# Patient Record
Sex: FEMALE | Race: WHITE | Employment: OTHER | URBAN - METROPOLITAN AREA
[De-identification: names, ages, dates, MRNs, and addresses within clinical notes are randomized per-mention and may not be internally consistent; named-entity substitution may affect disease eponyms.]

---

## 2017-06-16 ENCOUNTER — HOSPITAL ENCOUNTER (OUTPATIENT)
Dept: MAMMOGRAPHY | Age: 82
Discharge: HOME | End: 2017-06-16
Attending: INTERNAL MEDICINE
Payer: MEDICARE

## 2017-06-16 DIAGNOSIS — Z12.31 VISIT FOR SCREENING MAMMOGRAM: ICD-10-CM

## 2017-06-16 PROCEDURE — 77063 BREAST TOMOSYNTHESIS BI: CPT

## 2017-06-29 ENCOUNTER — HOSPITAL ENCOUNTER (OUTPATIENT)
Dept: MAMMOGRAPHY | Age: 82
Discharge: HOME | End: 2017-06-29
Attending: INTERNAL MEDICINE
Payer: MEDICARE

## 2017-06-29 DIAGNOSIS — R92.8 ABNORMAL MAMMOGRAM: ICD-10-CM

## 2017-06-29 PROCEDURE — 76642 ULTRASOUND BREAST LIMITED: CPT

## 2017-06-29 PROCEDURE — 77065 DX MAMMO INCL CAD UNI: CPT

## 2018-09-21 ENCOUNTER — HOSPITAL ENCOUNTER (OUTPATIENT)
Dept: MAMMOGRAPHY | Age: 83
Discharge: HOME | End: 2018-09-21
Attending: INTERNAL MEDICINE
Payer: MEDICARE

## 2018-09-21 DIAGNOSIS — Z12.31 VISIT FOR SCREENING MAMMOGRAM: ICD-10-CM

## 2018-09-21 PROCEDURE — 77063 BREAST TOMOSYNTHESIS BI: CPT

## 2019-09-25 ENCOUNTER — HOSPITAL ENCOUNTER (OUTPATIENT)
Dept: MAMMOGRAPHY | Age: 84
Discharge: HOME | End: 2019-09-25
Attending: INTERNAL MEDICINE
Payer: MEDICARE

## 2019-09-25 DIAGNOSIS — Z12.39 BREAST SCREENING: ICD-10-CM

## 2019-09-25 PROCEDURE — 77067 SCR MAMMO BI INCL CAD: CPT

## 2020-09-23 ENCOUNTER — OFFICE VISIT (OUTPATIENT)
Dept: CARDIOLOGY CLINIC | Age: 85
End: 2020-09-23
Payer: MEDICARE

## 2020-09-23 VITALS
DIASTOLIC BLOOD PRESSURE: 80 MMHG | BODY MASS INDEX: 34.17 KG/M2 | OXYGEN SATURATION: 98 % | HEART RATE: 50 BPM | SYSTOLIC BLOOD PRESSURE: 140 MMHG | WEIGHT: 181 LBS | HEIGHT: 61 IN | TEMPERATURE: 95.8 F

## 2020-09-23 DIAGNOSIS — R07.9 CHEST PAIN AT REST: Primary | ICD-10-CM

## 2020-09-23 DIAGNOSIS — I89.0 LYMPHEDEMA: ICD-10-CM

## 2020-09-23 PROCEDURE — G8400 PT W/DXA NO RESULTS DOC: HCPCS | Performed by: SPECIALIST

## 2020-09-23 PROCEDURE — 1101F PT FALLS ASSESS-DOCD LE1/YR: CPT | Performed by: SPECIALIST

## 2020-09-23 PROCEDURE — G8432 DEP SCR NOT DOC, RNG: HCPCS | Performed by: SPECIALIST

## 2020-09-23 PROCEDURE — G8417 CALC BMI ABV UP PARAM F/U: HCPCS | Performed by: SPECIALIST

## 2020-09-23 PROCEDURE — 99204 OFFICE O/P NEW MOD 45 MIN: CPT | Performed by: SPECIALIST

## 2020-09-23 PROCEDURE — 93010 ELECTROCARDIOGRAM REPORT: CPT | Performed by: SPECIALIST

## 2020-09-23 PROCEDURE — 93005 ELECTROCARDIOGRAM TRACING: CPT | Performed by: SPECIALIST

## 2020-09-23 PROCEDURE — G8536 NO DOC ELDER MAL SCRN: HCPCS | Performed by: SPECIALIST

## 2020-09-23 PROCEDURE — 1090F PRES/ABSN URINE INCON ASSESS: CPT | Performed by: SPECIALIST

## 2020-09-23 PROCEDURE — G0463 HOSPITAL OUTPT CLINIC VISIT: HCPCS | Performed by: SPECIALIST

## 2020-09-23 PROCEDURE — G8427 DOCREV CUR MEDS BY ELIG CLIN: HCPCS | Performed by: SPECIALIST

## 2020-09-23 RX ORDER — PANTOPRAZOLE SODIUM 40 MG/1
40 GRANULE, DELAYED RELEASE ORAL DAILY
COMMUNITY

## 2020-09-23 RX ORDER — EZETIMIBE 10 MG/1
TABLET ORAL
COMMUNITY

## 2020-09-23 RX ORDER — ASPIRIN 81 MG/1
TABLET ORAL DAILY
COMMUNITY

## 2020-09-23 RX ORDER — METOPROLOL SUCCINATE 50 MG/1
50 TABLET, EXTENDED RELEASE ORAL DAILY
COMMUNITY
End: 2022-02-23 | Stop reason: SDUPTHER

## 2020-09-23 RX ORDER — ALLOPURINOL 100 MG/1
TABLET ORAL DAILY
COMMUNITY

## 2020-09-23 RX ORDER — GLUCOSAMINE SULFATE 1500 MG
POWDER IN PACKET (EA) ORAL DAILY
COMMUNITY

## 2020-09-23 NOTE — PROGRESS NOTES
Alan Bailey is a 80 y.o. female    Visit Vitals  BP (!) 140/80 (BP 1 Location: Left arm, BP Patient Position: Sitting)   Pulse (!) 50   Temp (!) 95.8 °F (35.4 °C)   Ht 5' 1\" (1.549 m)   Wt 181 lb (82.1 kg)   SpO2 98%   BMI 34.20 kg/m²       Chief Complaint   Patient presents with    Chest Pain       Chest pain NO  SOB NO  Dizziness YES WHEN RISING FROM SITTING OR LAYING DOWN  Swelling NO  Recent hospital visit NO  Refills NO

## 2020-09-23 NOTE — PROGRESS NOTES
Camden Beauchamp MD. [facility] [place]              Patient: Matthew Mora  : 1935      Today's Date: 2020            HISTORY OF PRESENT ILLNESS:     History of Present Illness:    Self referred for chest pain. Started several months ago - would first get a twinge of pain going to back and shoulder blades. Comes on by itself. Getting more frequent. Lasts a few min. No problems walking - pain is not exertional.  Her overall energy is less past few months. PAST MEDICAL HISTORY:     Past Medical History:   Diagnosis Date    CKD (chronic kidney disease)     Sees Dr. Pasquale Mckenzie Dyslipidemia     GERD (gastroesophageal reflux disease)     Gout     HTN (hypertension)     Lymphedema     wears wraps         Past Surgical History:   Procedure Laterality Date    HX BREAST BIOPSY Bilateral 30+ yrs    negative pathology    HX BREAST LUMPECTOMY Right     pt said many years ago they went in and removed a lump on both breasts     HX BREAST LUMPECTOMY Left              MEDICATIONS:     Current Outpatient Medications   Medication Sig Dispense Refill    pantoprazole (PROTONIX) 40 mg granules for oral suspension 40 mg daily.  cholecalciferol (VITAMIN D3) 25 mcg (1,000 unit) cap Take  by mouth daily.  allopurinoL (ZYLOPRIM) 100 mg tablet Take  by mouth daily.  ezetimibe (ZETIA) 10 mg tablet Take  by mouth.  metoprolol succinate (TOPROL-XL) 50 mg XL tablet Take  by mouth daily.  aspirin delayed-release 81 mg tablet Take  by mouth daily.              No Known Allergies          SOCIAL HISTORY:     Social History     Tobacco Use    Smoking status: Never Smoker    Smokeless tobacco: Never Used   Substance Use Topics    Alcohol use: Not on file    Drug use: Not on file         FAMILY HISTORY:     Family History   Problem Relation Age of Onset    Breast Cancer Maternal Aunt     Heart Disease Mother              REVIEW OF SYMPTOMS:     Review of Symptoms:  Constitutional: Negative for fever, chills  HEENT: Negative for nosebleeds, tinnitus, and vision changes. Respiratory: Negative for cough, wheezing  Cardiovascular: Negative for orthopnea, claudication, syncope, and PND. Gastrointestinal: Negative for abdominal pain, diarrhea, melena. Genitourinary: Negative for dysuria  Musculoskeletal: Negative for myalgias. Skin: Negative for rash  Heme: No problems bleeding. Neurological: Negative for speech change and focal weakness. PHYSICAL EXAM:     Physical Exam:  Visit Vitals  BP (!) 140/80 (BP 1 Location: Left arm, BP Patient Position: Sitting)   Pulse (!) 50   Temp (!) 95.8 °F (35.4 °C)   Ht 5' 1\" (1.549 m)   Wt 181 lb (82.1 kg)   SpO2 98%   BMI 34.20 kg/m²     Patient appears generally well, mood and affect are appropriate and pleasant. HEENT:  Hearing intact, non-icteric, normocephalic, atraumatic. Neck Exam: Supple, No JVD or carotid bruits. Lung Exam: Clear to auscultation, even breath sounds. Cardiac Exam: Regular rate and rhythm with no murmur or rub  Abdomen: Soft, non-tender, normal bowel sounds. No bruits or masses. Extremities: Moves all ext well. Mild bilat lower extremity edema. Vascular: 2+ dorsalis pedis pulses bilaterally. Psych: Appropriate affect  Neuro - Grossly intact          LABS / OTHER STUDIES:     Labs followed by PCP         CARDIAC DIAGNOSTICS:     Cardiac Evaluation Includes:    EKG 9/23/20 - sinus quyen, incomplete RBBB, non-specific T wave abn             ASSESSMENT AND PLAN:     Assessment and Plan:  1) Atypical chest pain   - random spells lasting a few min - none exertional - less energy lately   - check echo and lexiscan cardiolite for cardiac evaluation     2) Lymphedema   - she does leg wraps and legs look good     3) CKD  - sees Dr. Michael Mota who follows labs     4) See me in 3 months     She lives in [place]. Sister (dementia) lives with her.  at 36 yo. Has 2 kids and 3 grand kids.   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        Donaldo Herndon MD, [address], [address]  [address] [place] [address] [address], [facility] [address]  [address]  Ph: [phone]   Ph [phone]        ADDENDUM   10/7/2020    Sunita Crea 10/6/20 - Normal MPI, LVEF 68%     Echo 10/6/20 - LVEF 55-60%, grade 1 diastology, mild LAE    Will call       ADDENDUM   10/8/2020  I called and gave her normal findings.

## 2020-10-05 ENCOUNTER — TELEPHONE (OUTPATIENT)
Dept: CARDIOLOGY CLINIC | Age: 85
End: 2020-10-05

## 2020-10-06 ENCOUNTER — ANCILLARY PROCEDURE (OUTPATIENT)
Dept: CARDIOLOGY CLINIC | Age: 85
End: 2020-10-06
Payer: MEDICARE

## 2020-10-06 VITALS
HEIGHT: 61 IN | BODY MASS INDEX: 34.17 KG/M2 | SYSTOLIC BLOOD PRESSURE: 132 MMHG | WEIGHT: 181 LBS | DIASTOLIC BLOOD PRESSURE: 80 MMHG

## 2020-10-06 VITALS — BODY MASS INDEX: 34.17 KG/M2 | WEIGHT: 181 LBS | HEIGHT: 61 IN

## 2020-10-06 DIAGNOSIS — I89.0 LYMPHEDEMA: ICD-10-CM

## 2020-10-06 DIAGNOSIS — R07.9 CHEST PAIN AT REST: ICD-10-CM

## 2020-10-06 LAB
STRESS BASELINE DIAS BP: 80 MMHG
STRESS BASELINE HR: 53 BPM
STRESS BASELINE SYS BP: 186 MMHG
STRESS O2 SAT PEAK: 99 %
STRESS O2 SAT REST: 98 %
STRESS PEAK DIAS BP: 76 MMHG
STRESS PEAK SYS BP: 188 MMHG
STRESS PERCENT HR ACHIEVED: 56 %
STRESS POST PEAK HR: 75 BPM
STRESS RATE PRESSURE PRODUCT: NORMAL BPM*MMHG
STRESS ST DEPRESSION: 0 MM
STRESS ST ELEVATION: 0 MM
STRESS TARGET HR: 135 BPM

## 2020-10-06 PROCEDURE — 93018 CV STRESS TEST I&R ONLY: CPT | Performed by: SPECIALIST

## 2020-10-06 PROCEDURE — 93017 CV STRESS TEST TRACING ONLY: CPT

## 2020-10-06 PROCEDURE — A9500 TC99M SESTAMIBI: HCPCS

## 2020-10-06 PROCEDURE — 93016 CV STRESS TEST SUPVJ ONLY: CPT | Performed by: SPECIALIST

## 2020-10-06 PROCEDURE — 78452 HT MUSCLE IMAGE SPECT MULT: CPT | Performed by: SPECIALIST

## 2020-10-06 PROCEDURE — 93306 TTE W/DOPPLER COMPLETE: CPT | Performed by: SPECIALIST

## 2020-10-06 RX ORDER — TETRAKIS(2-METHOXYISOBUTYLISOCYANIDE)COPPER(I) TETRAFLUOROBORATE 1 MG/ML
8.1 INJECTION, POWDER, LYOPHILIZED, FOR SOLUTION INTRAVENOUS ONCE
Status: COMPLETED | OUTPATIENT
Start: 2020-10-06 | End: 2020-10-06

## 2020-10-06 RX ORDER — TETRAKIS(2-METHOXYISOBUTYLISOCYANIDE)COPPER(I) TETRAFLUOROBORATE 1 MG/ML
25.3 INJECTION, POWDER, LYOPHILIZED, FOR SOLUTION INTRAVENOUS ONCE
Status: COMPLETED | OUTPATIENT
Start: 2020-10-06 | End: 2020-10-06

## 2020-10-06 RX ADMIN — REGADENOSON 0.4 MG: 0.08 INJECTION, SOLUTION INTRAVENOUS at 11:37

## 2020-10-06 RX ADMIN — TETRAKIS(2-METHOXYISOBUTYLISOCYANIDE)COPPER(I) TETRAFLUOROBORATE 8.1 MILLICURIE: 1 INJECTION, POWDER, LYOPHILIZED, FOR SOLUTION INTRAVENOUS at 10:23

## 2020-10-06 RX ADMIN — TECHNETIUM TC 99M SESTAMIBI 25.3 MILLICURIE: 1 INJECTION INTRAVENOUS at 11:37

## 2020-10-07 LAB
ECHO AO ROOT DIAM: 3.03 CM
ECHO AV AREA PEAK VELOCITY: 2.38 CM2
ECHO AV AREA PEAK VELOCITY: 2.38 CM2
ECHO AV AREA VTI: 2.38 CM2
ECHO AV AREA VTI: 2.38 CM2
ECHO AV MEAN GRADIENT: 3.8 MMHG
ECHO AV PEAK GRADIENT: 6.99 MMHG
ECHO AV PEAK VELOCITY: 132.19 CM/S
ECHO AV VTI: 33.5 CM
ECHO EST RA PRESSURE: 3 MMHG
ECHO LA AREA 4C: 19.87 CM2
ECHO LA MAJOR AXIS: 4.28 CM
ECHO LA MINOR AXIS: 2.36 CM
ECHO LA VOL 2C: 62.12 ML (ref 22–52)
ECHO LA VOL 4C: 55.69 ML (ref 22–52)
ECHO LA VOL BP: 64.97 ML (ref 22–52)
ECHO LA VOL/BSA BIPLANE: 35.88 ML/M2 (ref 16–28)
ECHO LA VOLUME INDEX A2C: 34.31 ML/M2 (ref 16–28)
ECHO LA VOLUME INDEX A4C: 30.76 ML/M2 (ref 16–28)
ECHO LV E' LATERAL VELOCITY: 7.39 CM/S
ECHO LV E' SEPTAL VELOCITY: 5.09 CM/S
ECHO LV EDV A2C: 75.33 ML
ECHO LV EDV A4C: 92.32 ML
ECHO LV EDV BP: 83.56 ML (ref 56–104)
ECHO LV EDV INDEX A4C: 51 ML/M2
ECHO LV EDV INDEX BP: 46.1 ML/M2
ECHO LV EDV NDEX A2C: 41.6 ML/M2
ECHO LV EJECTION FRACTION A2C: 54 PERCENT
ECHO LV EJECTION FRACTION A4C: 60 PERCENT
ECHO LV EJECTION FRACTION BIPLANE: 56.9 PERCENT (ref 55–100)
ECHO LV ESV A2C: 34.38 ML
ECHO LV ESV A4C: 36.6 ML
ECHO LV ESV BP: 36.05 ML (ref 19–49)
ECHO LV ESV INDEX A2C: 19 ML/M2
ECHO LV ESV INDEX A4C: 20.2 ML/M2
ECHO LV ESV INDEX BP: 19.9 ML/M2
ECHO LV INTERNAL DIMENSION DIASTOLIC: 5.31 CM (ref 3.9–5.3)
ECHO LV INTERNAL DIMENSION SYSTOLIC: 3.7 CM
ECHO LV IVSD: 1 CM (ref 0.6–0.9)
ECHO LV MASS 2D: 190.5 G (ref 67–162)
ECHO LV MASS INDEX 2D: 105.2 G/M2 (ref 43–95)
ECHO LV POSTERIOR WALL DIASTOLIC: 0.92 CM (ref 0.6–0.9)
ECHO LVOT DIAM: 2.17 CM
ECHO LVOT PEAK GRADIENT: 2.88 MMHG
ECHO LVOT PEAK VELOCITY: 84.92 CM/S
ECHO LVOT SV: 79.9 ML
ECHO LVOT VTI: 21.51 CM
ECHO MV A VELOCITY: 75.8 CM/S
ECHO MV E DECELERATION TIME (DT): 0.16 S
ECHO MV E VELOCITY: 81.17 CM/S
ECHO MV E/A RATIO: 1.07
ECHO MV E/E' LATERAL: 10.98
ECHO MV E/E' RATIO (AVERAGED): 13.47
ECHO MV E/E' SEPTAL: 15.95
ECHO MV PRESSURE HALF TIME (PHT): 0.05 S
ECHO RA AREA 4C: 13.18 CM2
ECHO RIGHT VENTRICULAR SYSTOLIC PRESSURE (RVSP): 30.71 MMHG
ECHO RIGHT VENTRICULAR SYSTOLIC PRESSURE (RVSP): 38.31 MMHG
ECHO RIGHT VENTRICULAR SYSTOLIC PRESSURE (RVSP): 38.31 MMHG
ECHO RV INTERNAL DIMENSION: 3.38 CM
ECHO RV TAPSE: 2.21 CM (ref 1.5–2)
ECHO TV REGURGITANT MAX VELOCITY: 263.18 CM/S
ECHO TV REGURGITANT MAX VELOCITY: 297.12 CM/S
ECHO TV REGURGITANT MAX VELOCITY: 297.12 CM/S
ECHO TV REGURGITANT PEAK GRADIENT: 27.71 MMHG
ECHO TV REGURGITANT PEAK GRADIENT: 35.31 MMHG
ECHO TV REGURGITANT PEAK GRADIENT: 35.31 MMHG
LA VOL DISK BP: 59.33 ML (ref 22–52)

## 2020-12-09 ENCOUNTER — VIRTUAL VISIT (OUTPATIENT)
Dept: CARDIOLOGY CLINIC | Age: 85
End: 2020-12-09
Payer: MEDICARE

## 2020-12-09 DIAGNOSIS — R07.9 CHEST PAIN AT REST: Primary | ICD-10-CM

## 2020-12-09 DIAGNOSIS — I89.0 LYMPHEDEMA: ICD-10-CM

## 2020-12-09 PROCEDURE — 99443 PR PHYS/QHP TELEPHONE EVALUATION 21-30 MIN: CPT | Performed by: SPECIALIST

## 2020-12-09 RX ORDER — ASCORBIC ACID 250 MG
TABLET ORAL
COMMUNITY

## 2020-12-09 NOTE — PROGRESS NOTES
Mary Delacruz MD. [facility]  [address] [facility] [facility], [address] [place] [address] [phone]; Fax [phone]        Patient: Kerry Downs  : 1935      Today's Date: 2020        [facility] Telemedicine Encounter                                                         Pursuant to the emergency declaration under the [address] [place] [address], [facility] waiver authority and the [employer] Act, this Virtual  Visit was conducted, with patient's consent, to reduce the patient's risk of exposure to COVID-19 and provide continuity of care for an established patient. Services were provided through a video synchronous discussion virtually to substitute for in-person clinic visit. HISTORY OF PRESENT ILLNESS:     History of Present Illness:    Kerry Downs is a 80 y.o. female who was seen by synchronous (real-time) audio  technology on 2020. Feels a hiccup in chest once in a while - three times a day - lasts for a second. Otherwise feels OK. No CP walking. Remains active. PAST MEDICAL HISTORY:     Past Medical History:   Diagnosis Date    CKD (chronic kidney disease)     Sees Dr. Dennie Bevels Dyslipidemia     GERD (gastroesophageal reflux disease)     Gout     HTN (hypertension)     Lymphedema     wears wraps           Past Surgical History:   Procedure Laterality Date    HX BREAST BIOPSY Bilateral 30+ yrs    negative pathology    HX BREAST LUMPECTOMY Right     pt said many years ago they went in and removed a lump on both breasts     HX BREAST LUMPECTOMY Left            There is no problem list on file for this patient. MEDICATIONS:     Current Outpatient Medications   Medication Sig Dispense Refill    ascorbic acid, vitamin C, (Vitamin C) 250 mg tablet Take  by mouth.  pantoprazole (PROTONIX) 40 mg granules for oral suspension 40 mg daily.       cholecalciferol (VITAMIN D3) 25 mcg (1,000 unit) cap Take  by mouth daily.  allopurinoL (ZYLOPRIM) 100 mg tablet Take  by mouth daily.  ezetimibe (ZETIA) 10 mg tablet Take  by mouth.  metoprolol succinate (TOPROL-XL) 50 mg XL tablet Take  by mouth daily.  aspirin delayed-release 81 mg tablet Take  by mouth daily. No Known Allergies            SOCIAL HISTORY:     Social History     Tobacco Use    Smoking status: Never Smoker    Smokeless tobacco: Never Used   Substance Use Topics    Alcohol use: Not on file    Drug use: Not on file               FAMILY HISTORY:     Family History   Problem Relation Age of Onset    Breast Cancer Maternal Aunt     Heart Disease Mother                REVIEW OF SYMPTOMS:     Review of Symptoms:  Constitutional: Negative for fever, chills  HEENT: Negative for nosebleeds, vision changes. Respiratory: Negative for cough, wheezing  Cardiovascular: Negative for claudication, syncope  Gastrointestinal: Negative for abdominal pain, diarrhea, melena. Genitourinary: Negative for dysuria  Musculoskeletal: Negative for myalgias. Skin: Negative for rash  Heme: No problems bleeding. Neurological: Negative for speech change and focal weakness. PHYSICAL EXAM:       Physical Exam:    Due to this being a TeleHealth evaluation, many elements of the physical examination are unable to be assessed.      Phone call only           LABS / OTHER STUDIES:         No results found for: CHOL, CHOLX, CHLST, CHOLV, 999665, HDL, HDLP, LDL, LDLC, DLDLP, TGLX, TRIGL, TRIGP, CHHD, CHHDX    No results found for: NA, K, CL, CO2, AGAP, GLU, BUN, CREA, BUCR, GFRAA, GFRNA, CA, TBIL, TBILI, AP, TP, ALB, GLOB, AGRAT, ALT     No results found for: WBC, WBCLT, HGBPOC, HGB, HGBP, HCTPOC, HCT, PHCT, RBCH, PLT, MCV, HGBEXT, HCTEXT, PLTEXT    No results found for: TSH, TSH2, TSH3, TSHP, TSHEXT            CARDIAC DIAGNOSTICS:      Cardiac Evaluation Includes:     EKG 9/23/20 - sinus quyen, incomplete RBBB, non-specific T wave abn         Lexiscan Cardiolite 10/6/20 - Normal MPI, LVEF 68%      Echo 10/6/20 - LVEF 55-60%, grade 1 diastology, mild LAE         ASSESSMENT AND PLAN:      Assessment and Plan:  1) Atypical chest pain   - Lexiscan Cardiolite 10/6/20 - Normal MPI, LVEF 68%   - Echo 10/6/20 - LVEF 55-60%, grade 1 diastology, mild LAE   - She has a \"thump\" in chest for a second - sounds like she may be having PVC's -- I discussed checking a heart monitor and she decided to get one later if problems worsen   - Overall she is active and doing well without anginal complaints      2) Lymphedema   - she does leg wraps and legs look good      3) CKD  - sees Dr. Wilton Pimentel who follows labs      4) See me in 6 months      She lives in [place]. Sister (dementia) lives with her.  at 36 yo. Has 2 kids and 3 grand kids. Worked at [employer] staff.          Celia Dumont MD, [address], [address]      [address] [place] [address]. [address]  [address]  Ph: [phone]                               Ph [phone]                      We discussed the expected course, resolution and complications of the diagnosis(es) in detail. Medication risks, benefits, costs, interactions, and alternatives were discussed as indicated. I advised her to contact the office if her condition worsens, changes or fails to improve as anticipated. She expressed understanding with the diagnosis(es) and plan    Ramila Ford MD    Greater than 20 minutes was spent in direct video patient care, planning and chart review. This visit was conducted using [employer] services.

## 2020-12-09 NOTE — PROGRESS NOTES
Chandler Neff is a 80 y.o. female    There were no vitals taken for this visit. No chief complaint on file. Chest pain YES  SOB NO  Dizziness YES WHEN GETTING UP  Swelling NO  Recent hospital visit NO  Refills NO      PREFERS SPEAKING ONLY PHONE CALL.

## 2021-01-04 ENCOUNTER — TRANSCRIBE ORDER (OUTPATIENT)
Dept: SCHEDULING | Age: 86
End: 2021-01-04

## 2021-01-04 DIAGNOSIS — Z12.31 SCREENING MAMMOGRAM FOR HIGH-RISK PATIENT: Primary | ICD-10-CM

## 2021-03-10 ENCOUNTER — HOSPITAL ENCOUNTER (OUTPATIENT)
Dept: MAMMOGRAPHY | Age: 86
Discharge: HOME | End: 2021-03-10
Attending: INTERNAL MEDICINE
Payer: MEDICARE

## 2021-03-10 DIAGNOSIS — Z12.31 SCREENING MAMMOGRAM FOR HIGH-RISK PATIENT: ICD-10-CM

## 2021-03-10 PROCEDURE — 77067 SCR MAMMO BI INCL CAD: CPT

## 2021-05-10 ENCOUNTER — OFFICE VISIT (OUTPATIENT)
Dept: CARDIOLOGY CLINIC | Age: 86
End: 2021-05-10
Payer: MEDICARE

## 2021-05-10 VITALS
HEIGHT: 61 IN | HEART RATE: 52 BPM | SYSTOLIC BLOOD PRESSURE: 166 MMHG | OXYGEN SATURATION: 97 % | WEIGHT: 177 LBS | BODY MASS INDEX: 33.42 KG/M2 | DIASTOLIC BLOOD PRESSURE: 74 MMHG

## 2021-05-10 DIAGNOSIS — I10 ESSENTIAL HYPERTENSION: Primary | ICD-10-CM

## 2021-05-10 DIAGNOSIS — I89.0 LYMPHEDEMA: ICD-10-CM

## 2021-05-10 DIAGNOSIS — N18.9 CHRONIC KIDNEY DISEASE, UNSPECIFIED CKD STAGE: ICD-10-CM

## 2021-05-10 PROCEDURE — G8417 CALC BMI ABV UP PARAM F/U: HCPCS | Performed by: SPECIALIST

## 2021-05-10 PROCEDURE — 1090F PRES/ABSN URINE INCON ASSESS: CPT | Performed by: SPECIALIST

## 2021-05-10 PROCEDURE — G8753 SYS BP > OR = 140: HCPCS | Performed by: SPECIALIST

## 2021-05-10 PROCEDURE — G8510 SCR DEP NEG, NO PLAN REQD: HCPCS | Performed by: SPECIALIST

## 2021-05-10 PROCEDURE — G0463 HOSPITAL OUTPT CLINIC VISIT: HCPCS | Performed by: SPECIALIST

## 2021-05-10 PROCEDURE — G8427 DOCREV CUR MEDS BY ELIG CLIN: HCPCS | Performed by: SPECIALIST

## 2021-05-10 PROCEDURE — 99214 OFFICE O/P EST MOD 30 MIN: CPT | Performed by: SPECIALIST

## 2021-05-10 PROCEDURE — G8754 DIAS BP LESS 90: HCPCS | Performed by: SPECIALIST

## 2021-05-10 PROCEDURE — G8400 PT W/DXA NO RESULTS DOC: HCPCS | Performed by: SPECIALIST

## 2021-05-10 PROCEDURE — G8536 NO DOC ELDER MAL SCRN: HCPCS | Performed by: SPECIALIST

## 2021-05-10 PROCEDURE — 1101F PT FALLS ASSESS-DOCD LE1/YR: CPT | Performed by: SPECIALIST

## 2021-05-10 RX ORDER — CHLORTHALIDONE 25 MG/1
TABLET ORAL
COMMUNITY
Start: 2021-05-08 | End: 2021-06-21 | Stop reason: SDUPTHER

## 2021-05-10 NOTE — PROGRESS NOTES
Room 5     Elev BP  Slight H/A    Visit Vitals  BP (!) 166/74 (BP 1 Location: Left upper arm, BP Patient Position: Sitting, BP Cuff Size: Large adult)   Pulse (!) 52   Ht 5' 1\" (1.549 m)   Wt 177 lb (80.3 kg)   SpO2 97%   BMI 33.44 kg/m²         Chest pain: no  Shortness of breath: no  Edema: no  Palpitations: no  Dizziness: no    New diagnosis/Surgeries: no    ER/Hospitalizations: no    Refills: no

## 2021-06-10 LAB
ALDOST SERPL-MCNC: 14.4 NG/DL (ref 0–30)
ALDOST/RENIN PLAS-RTO: 3.5 {RATIO} (ref 0–30)
BUN SERPL-MCNC: 40 MG/DL (ref 8–27)
BUN/CREAT SERPL: 16 (ref 12–28)
CALCIUM SERPL-MCNC: 9.7 MG/DL (ref 8.7–10.3)
CHLORIDE SERPL-SCNC: 99 MMOL/L (ref 96–106)
CO2 SERPL-SCNC: 25 MMOL/L (ref 20–29)
CREAT SERPL-MCNC: 2.47 MG/DL (ref 0.57–1)
GLUCOSE SERPL-MCNC: 110 MG/DL (ref 65–99)
INTERPRETATION: NORMAL
POTASSIUM SERPL-SCNC: 4.2 MMOL/L (ref 3.5–5.2)
RENIN PLAS-CCNC: 4.17 NG/ML/HR (ref 0.17–5.38)
SODIUM SERPL-SCNC: 142 MMOL/L (ref 134–144)
TSH SERPL DL<=0.005 MIU/L-ACNC: 1.98 UIU/ML (ref 0.45–4.5)

## 2021-06-11 ENCOUNTER — TELEPHONE (OUTPATIENT)
Dept: CARDIOLOGY CLINIC | Age: 86
End: 2021-06-11

## 2021-06-11 NOTE — PROGRESS NOTES
Can you please call patient and see what her Cr was with Nephrology prior to starting chlorthalidone. Her Cr here is 2.4 which is high. She should talk with her Nephrologist about recent labs and see what they say about what to do with her diuretic.    Thanks,  SK

## 2021-06-11 NOTE — TELEPHONE ENCOUNTER
Called pt,  Per  Dearl Fitting: Breann you please call patient and see what her Cr was with Nephrology prior to starting chlorthalidone.   Her Cr here is 2.4 which is high.  She should talk with her Nephrologist about recent labs and see what they say about what to do with her diuretic. \"    She stated she sees Myrna Lopez (?) retired. Doesn't remember new physician's name. Have requested recent labs.

## 2021-06-21 ENCOUNTER — OFFICE VISIT (OUTPATIENT)
Dept: CARDIOLOGY CLINIC | Age: 86
End: 2021-06-21
Payer: MEDICARE

## 2021-06-21 VITALS
HEART RATE: 70 BPM | WEIGHT: 164 LBS | OXYGEN SATURATION: 96 % | BODY MASS INDEX: 30.96 KG/M2 | HEIGHT: 61 IN | SYSTOLIC BLOOD PRESSURE: 122 MMHG | DIASTOLIC BLOOD PRESSURE: 64 MMHG

## 2021-06-21 DIAGNOSIS — I10 ESSENTIAL HYPERTENSION: Primary | ICD-10-CM

## 2021-06-21 DIAGNOSIS — N18.9 CHRONIC KIDNEY DISEASE, UNSPECIFIED CKD STAGE: ICD-10-CM

## 2021-06-21 PROCEDURE — 99214 OFFICE O/P EST MOD 30 MIN: CPT | Performed by: SPECIALIST

## 2021-06-21 PROCEDURE — G8536 NO DOC ELDER MAL SCRN: HCPCS | Performed by: SPECIALIST

## 2021-06-21 PROCEDURE — 1101F PT FALLS ASSESS-DOCD LE1/YR: CPT | Performed by: SPECIALIST

## 2021-06-21 PROCEDURE — G8417 CALC BMI ABV UP PARAM F/U: HCPCS | Performed by: SPECIALIST

## 2021-06-21 PROCEDURE — G8432 DEP SCR NOT DOC, RNG: HCPCS | Performed by: SPECIALIST

## 2021-06-21 PROCEDURE — 1090F PRES/ABSN URINE INCON ASSESS: CPT | Performed by: SPECIALIST

## 2021-06-21 PROCEDURE — G8427 DOCREV CUR MEDS BY ELIG CLIN: HCPCS | Performed by: SPECIALIST

## 2021-06-21 PROCEDURE — G0463 HOSPITAL OUTPT CLINIC VISIT: HCPCS | Performed by: SPECIALIST

## 2021-06-21 RX ORDER — CHLORTHALIDONE 25 MG/1
12.5 TABLET ORAL EVERY OTHER DAY
Qty: 30 TABLET | Refills: 3 | Status: SHIPPED
Start: 2021-06-21 | End: 2021-07-01 | Stop reason: SINTOL

## 2021-06-21 NOTE — PROGRESS NOTES
Araceli Newman MD. [facility] [place]              Patient: Kortney Rousseau  : 1935      Today's Date: 2021          HISTORY OF PRESENT ILLNESS:     History of Present Illness:  Here for follow-up. She feels weak - lost 14 lbs on the diuretic --- she was taking it just every other day. BP is OK now. PAST MEDICAL HISTORY:     Past Medical History:   Diagnosis Date    CKD (chronic kidney disease)     Sees Dr. Skylar Olea Dyslipidemia     GERD (gastroesophageal reflux disease)     Gout     HTN (hypertension)     Lymphedema     wears wraps       Past Surgical History:   Procedure Laterality Date    HX BREAST BIOPSY Bilateral 30+ yrs    negative pathology    HX BREAST LUMPECTOMY Right     pt said many years ago they went in and removed a lump on both breasts     HX BREAST LUMPECTOMY Left            MEDICATIONS:     Current Outpatient Medications   Medication Sig Dispense Refill    chlorthalidone (HYGROTON) 25 mg tablet TAKE 1 TABLET BY MOUTH EVERY DAY      ascorbic acid, vitamin C, (Vitamin C) 250 mg tablet Take  by mouth.  pantoprazole (PROTONIX) 40 mg granules for oral suspension 40 mg daily.  cholecalciferol (VITAMIN D3) 25 mcg (1,000 unit) cap Take  by mouth daily.  allopurinoL (ZYLOPRIM) 100 mg tablet Take  by mouth daily.  ezetimibe (ZETIA) 10 mg tablet Take  by mouth.  metoprolol succinate (TOPROL-XL) 50 mg XL tablet Take  by mouth daily.  aspirin delayed-release 81 mg tablet Take  by mouth daily.          No Known Allergies        SOCIAL HISTORY:     Social History     Tobacco Use    Smoking status: Former Smoker     Quit date:      Years since quittin.4    Smokeless tobacco: Never Used   Substance Use Topics    Alcohol use: Not Currently    Drug use: Never         FAMILY HISTORY:     Family History   Problem Relation Age of Onset    Breast Cancer Maternal Aunt     Heart Disease Mother            REVIEW OF SYMPTOMS:     Review of Symptoms:  Constitutional: Negative for fever, chills  HEENT: Negative for nosebleeds, tinnitus, and vision changes. Respiratory: Negative for cough, wheezing  Cardiovascular: Negative for orthopnea, claudication, syncope, and PND. Gastrointestinal: Negative for abdominal pain,   melena.  + diarrhea   Genitourinary: Negative for dysuria  Musculoskeletal: Negative for myalgias. Skin: Negative for rash  Heme: No problems bleeding. Neurological: Negative for speech change and focal weakness. PHYSICAL EXAM:     Physical Exam:  Visit Vitals  /64 (BP 1 Location: Left upper arm, BP Patient Position: Sitting, BP Cuff Size: Large adult)   Pulse 70   Ht 5' 1\" (1.549 m)   Wt 164 lb (74.4 kg)   SpO2 96%   BMI 30.99 kg/m²     Patient appears generally well, mood and affect are appropriate and pleasant. HEENT:  Hearing intact, non-icteric, normocephalic, atraumatic. Neck Exam: Supple,    Lung Exam: Clear to auscultation, even breath sounds. Cardiac Exam: Regular rate and rhythm with no murmur or rub  Abdomen: Soft, non-tender, normal bowel sounds. Extremities: Moves all ext well. Mild bilat lower extremity edema. MSKTL: Overall good ROM ext  Skin: No significant rashes  Psych: Appropriate affect  Neuro - Grossly intact      LABS / OTHER STUDIES reviewed:     Lab Results   Component Value Date/Time    Sodium 142 06/04/2021 01:24 PM    Potassium 4.2 06/04/2021 01:24 PM    Chloride 99 06/04/2021 01:24 PM    CO2 25 06/04/2021 01:24 PM    Glucose 110 (H) 06/04/2021 01:24 PM    BUN 40 (H) 06/04/2021 01:24 PM    Creatinine 2.47 (H) 06/04/2021 01:24 PM    BUN/Creatinine ratio 16 06/04/2021 01:24 PM    GFR est AA 20 (L) 06/04/2021 01:24 PM    GFR est non-AA 17 (L) 06/04/2021 01:24 PM    Calcium 9.7 06/04/2021 01:24 PM       Lab Results   Component Value Date/Time    TSH 1.980 06/04/2021 01:24 PM           Labs 5/3/21 - Cr 1.85, Hgb 11, K 4.6           CARDIAC DIAGNOSTICS:     Cardiac Evaluation Includes:   I reviewed the results below. EKG 9/23/20 - sinus quyen, incomplete RBBB, non-specific T wave abn         Lexiscan Cardiolite 10/6/20 - Normal MPI, LVEF 68%      Echo 10/6/20 - LVEF 55-60%, grade 1 diastology, mild LAE          ASSESSMENT AND PLAN:     Assessment and Plan:    1) HTN  - Was started on chlorthalidone by Nephrology on May 7, 2021   - On 6/4/21 labs show Cr increased from 1.85 to 2.47  - On 6/21/21 - She could only tolerate chlorthalidone every other day and still has LETTY on CKD and weakness from it -----> Will lower dose to chlorthalidone 12.5 mg just three days a week and follow labs -- If Cr remains high, will choose another agent.    2) Atypical chest pain   - Lexiscan Cardiolite 10/6/20 - Normal MPI, LVEF 68%   - Echo 10/6/20 - LVEF 55-60%, grade 1 diastology, mild LAE   - She has a \"thump\" in chest for a second - sounds like she may be having PVC's -- I discussed checking a heart monitor and she decided to get one later if problems worsen - symptoms sound benign  - Has a slight heart thump 2-3 x a day (just like a light poke). - Overall she is active and doing well without anginal complaints      3) Lymphedema   - she does leg wraps and legs look good      4) CKD  - Sees Nephrology   - labs followed by Nephrology      5) See me in 3 months     She lives in [place].  Sister (dementia) lives with her.   at 36 yo.  Has 2 kids and 3 grand kids.  Worked at [address] [place] [address] and [employer] staff.             Vickie Lees MD, [address].  [address], [address] [place] [address]  Ph: [phone]   Ph [phone]

## 2021-06-21 NOTE — PROGRESS NOTES
Chief Complaint   Patient presents with    Follow-up     1 month    Hypertension    Other     lymphedema     Visit Vitals  /64 (BP 1 Location: Left upper arm, BP Patient Position: Sitting, BP Cuff Size: Large adult)   Pulse 70   Ht 5' 1\" (1.549 m)   Wt 164 lb (74.4 kg)   SpO2 96%   BMI 30.99 kg/m²     Chest pain denied   SOB denied   Palpitations denied   Swelling in hands/feet denied   Dizziness denied   Recent hospital stays denied   Refills denied     BP was way high, started   Since started diuretic, has been \"listless;\" no energy.

## 2021-06-30 LAB
BUN SERPL-MCNC: 31 MG/DL (ref 8–27)
BUN/CREAT SERPL: 12 (ref 12–28)
CALCIUM SERPL-MCNC: 10 MG/DL (ref 8.7–10.3)
CHLORIDE SERPL-SCNC: 98 MMOL/L (ref 96–106)
CO2 SERPL-SCNC: 26 MMOL/L (ref 20–29)
CREAT SERPL-MCNC: 2.59 MG/DL (ref 0.57–1)
GLUCOSE SERPL-MCNC: 101 MG/DL (ref 65–99)
POTASSIUM SERPL-SCNC: 4.3 MMOL/L (ref 3.5–5.2)
SODIUM SERPL-SCNC: 142 MMOL/L (ref 134–144)
SPECIMEN STATUS REPORT, ROLRST: NORMAL

## 2021-07-01 ENCOUNTER — TELEPHONE (OUTPATIENT)
Dept: CARDIOLOGY CLINIC | Age: 86
End: 2021-07-01

## 2021-07-01 NOTE — TELEPHONE ENCOUNTER
Spoke to pt,  Per Dr. Ha Lisa: Lizeth Gilmna you please call patient. Afia Miranda is higher.  Please have her stop chlorthalidone.  Please have her see Nephrology to FU on kidney function and HTN. \"  Updated med rec. She requested that I forward labs to [facility] Dr. Winthrop Holstein.  Niraj irizarry [phone] complete

## 2021-07-01 NOTE — PROGRESS NOTES
Can you please call patient. Cr is higher. Please have her stop chlorthalidone. Please have her see Nephrology to FU on kidney function and HTN.    Thanks  SK

## 2021-07-21 ENCOUNTER — OFFICE VISIT (OUTPATIENT)
Dept: CARDIOLOGY CLINIC | Age: 86
End: 2021-07-21
Payer: MEDICARE

## 2021-07-21 VITALS
OXYGEN SATURATION: 98 % | BODY MASS INDEX: 30.96 KG/M2 | SYSTOLIC BLOOD PRESSURE: 138 MMHG | DIASTOLIC BLOOD PRESSURE: 62 MMHG | WEIGHT: 164 LBS | HEIGHT: 61 IN | HEART RATE: 55 BPM

## 2021-07-21 DIAGNOSIS — I10 ESSENTIAL HYPERTENSION: Primary | ICD-10-CM

## 2021-07-21 DIAGNOSIS — I89.0 LYMPHEDEMA: ICD-10-CM

## 2021-07-21 DIAGNOSIS — N18.9 CHRONIC KIDNEY DISEASE, UNSPECIFIED CKD STAGE: ICD-10-CM

## 2021-07-21 PROCEDURE — 1090F PRES/ABSN URINE INCON ASSESS: CPT | Performed by: SPECIALIST

## 2021-07-21 PROCEDURE — G8427 DOCREV CUR MEDS BY ELIG CLIN: HCPCS | Performed by: SPECIALIST

## 2021-07-21 PROCEDURE — 1101F PT FALLS ASSESS-DOCD LE1/YR: CPT | Performed by: SPECIALIST

## 2021-07-21 PROCEDURE — 99214 OFFICE O/P EST MOD 30 MIN: CPT | Performed by: SPECIALIST

## 2021-07-21 PROCEDURE — G8432 DEP SCR NOT DOC, RNG: HCPCS | Performed by: SPECIALIST

## 2021-07-21 PROCEDURE — G8536 NO DOC ELDER MAL SCRN: HCPCS | Performed by: SPECIALIST

## 2021-07-21 PROCEDURE — G8417 CALC BMI ABV UP PARAM F/U: HCPCS | Performed by: SPECIALIST

## 2021-07-21 PROCEDURE — G0463 HOSPITAL OUTPT CLINIC VISIT: HCPCS | Performed by: SPECIALIST

## 2021-07-21 NOTE — PROGRESS NOTES
Zoe Knight MD. [facility] [place]              Patient: Jose Mcdowell  : 1935      Today's Date: 2021          HISTORY OF PRESENT ILLNESS:     History of Present Illness:  BP has been OK. She feels well. No CP or SOB. No orthopnea. She feels fine. PAST MEDICAL HISTORY:     Past Medical History:   Diagnosis Date    CKD (chronic kidney disease)     Sees Dr. Shu Leon Dyslipidemia     GERD (gastroesophageal reflux disease)     Gout     HTN (hypertension)     Lymphedema     wears wraps       Past Surgical History:   Procedure Laterality Date    HX BREAST BIOPSY Bilateral 30+ yrs    negative pathology    HX BREAST LUMPECTOMY Right     pt said many years ago they went in and removed a lump on both breasts     HX BREAST LUMPECTOMY Left          MEDICATIONS:     Current Outpatient Medications   Medication Sig Dispense Refill    ascorbic acid, vitamin C, (Vitamin C) 250 mg tablet Take  by mouth.  pantoprazole (PROTONIX) 40 mg granules for oral suspension 40 mg daily.  cholecalciferol (VITAMIN D3) 25 mcg (1,000 unit) cap Take  by mouth daily.  allopurinoL (ZYLOPRIM) 100 mg tablet Take  by mouth daily.  ezetimibe (ZETIA) 10 mg tablet Take  by mouth.  metoprolol succinate (TOPROL-XL) 50 mg XL tablet Take  by mouth daily.  aspirin delayed-release 81 mg tablet Take  by mouth daily. No Known Allergies          SOCIAL HISTORY:     Social History     Tobacco Use    Smoking status: Former Smoker     Quit date:      Years since quittin.5    Smokeless tobacco: Never Used   Substance Use Topics    Alcohol use: Not Currently    Drug use: Never         FAMILY HISTORY:     Family History   Problem Relation Age of Onset    Breast Cancer Maternal Aunt     Heart Disease Mother            REVIEW OF SYMPTOMS:     Review of Symptoms:  Constitutional: Negative for fever, chills  HEENT: Negative for nosebleeds, tinnitus, and vision changes.    Respiratory: Negative for cough, wheezing  Cardiovascular: Negative for orthopnea, claudication, syncope, and PND. Gastrointestinal: Negative for abdominal pain, diarrhea, melena. Genitourinary: Negative for dysuria  Musculoskeletal: Negative for myalgias. Skin: Negative for rash  Heme: No problems bleeding. Neurological: Negative for speech change and focal weakness. PHYSICAL EXAM:     Physical Exam:  Visit Vitals  /62 (BP 1 Location: Left upper arm, BP Patient Position: Sitting, BP Cuff Size: Large adult)   Pulse (!) 55   Ht 5' 1\" (1.549 m)   Wt 164 lb (74.4 kg)   SpO2 98%   BMI 30.99 kg/m²     Patient appears generally well, mood and affect are appropriate and pleasant. HEENT:  Hearing intact, non-icteric, normocephalic, atraumatic. Neck Exam: Supple    Lung Exam: Clear to auscultation, even breath sounds. Cardiac Exam: Regular rate and rhythm with no murmur or rub  Abdomen: Soft, non-tender,   Extremities: Moves all ext well. + mild chronic lower extremity edema. MSKTL: Overall good ROM ext  Skin: No significant rashes  Psych: Appropriate affect  Neuro - Grossly intact      LABS / OTHER STUDIES reviewed:     Labs 5/3/21 - Cr 1.85, Hgb 11, K 4.6      Lab Results   Component Value Date/Time    Sodium 142 06/29/2021 11:02 AM    Potassium 4.3 06/29/2021 11:02 AM    Chloride 98 06/29/2021 11:02 AM    CO2 26 06/29/2021 11:02 AM    Glucose 101 (H) 06/29/2021 11:02 AM    BUN 31 (H) 06/29/2021 11:02 AM    Creatinine 2.59 (H) 06/29/2021 11:02 AM    BUN/Creatinine ratio 12 06/29/2021 11:02 AM    GFR est AA 19 (L) 06/29/2021 11:02 AM    GFR est non-AA 16 (L) 06/29/2021 11:02 AM    Calcium 10.0 06/29/2021 11:02 AM           CARDIAC DIAGNOSTICS:     Cardiac Evaluation Includes:  I reviewed the results below.      EKG 9/23/20 - sinus quyen, incomplete RBBB, non-specific T wave abn         Lexiscan Cardiolite 10/6/20 - Normal MPI, LVEF 68%      Echo 10/6/20 - LVEF 55-60%, grade 1 diastology, mild LAE          ASSESSMENT AND PLAN:     Assessment and Plan:    1) HTN  - Was started on chlorthalidone by Nephrology on May 7, 2021. On 6/4/21 labs show Cr increased from 1.85 to 2.47. On 6/21/21 ---> Had her stop chlorthalidone as Cr was 2.5. Advised to see Nephrology to FU on kidney function and HTN.   - On 7/21/21 - she is doing OK and BP seems OK (BP at home runs 122/60 or so) -- cont Toprol XL     2) Atypical chest pain   - Lexiscan Cardiolite 10/6/20 - Normal MPI, LVEF 68%   - Echo 10/6/20 - LVEF 55-60%, grade 1 diastology, mild LAE   - She has a \"thump\" in chest for a second - sounds like she may be having PVC's -- I discussed checking a heart monitor and she decided to get one later if problems worsen - symptoms sound benign  - Has a slight heart thump 2-3 x a day (just like a light poke).     - Overall she is active and doing well without anginal complaints      3) Lymphedema   - she does leg wraps and legs look good      4) CKD  - Sees Nephrology   - labs followed by Nephrology      5) Dyslipidemia   - Has been on Zetia for years (denies problems with a statin)   - labs followed by PCP     6) See me in 6 months (her preference)      She lives in [place].  Sister (dementia) lives with her.   at 36 yo.  Has 2 kids and 3 grand kids.  Worked at [employer] and [employer] staff.           Josias Gonzalez MD, [address], [address]  [address] [place] [address].  [address] [place] [address], [address]  [place], [address] [place] [address]  Ph: [phone]   Ph [phone]

## 2021-07-21 NOTE — PROGRESS NOTES
Chief Complaint   Patient presents with    Follow-up     1 month    Irregular Heart Beat    Hypertension     Visit Vitals  /62 (BP 1 Location: Left upper arm, BP Patient Position: Sitting, BP Cuff Size: Large adult)   Pulse (!) 55   Ht 5' 1\" (1.549 m)   Wt 164 lb (74.4 kg)   SpO2 98%   BMI 30.99 kg/m²     Chest pain denied   SOB denied   Palpitations denied   Swelling in hands/feet denied   Dizziness denied   Recent hospital stays denied   Refills denied

## 2022-01-12 ENCOUNTER — OFFICE VISIT (OUTPATIENT)
Dept: CARDIOLOGY CLINIC | Age: 87
End: 2022-01-12
Payer: MEDICARE

## 2022-01-12 VITALS
HEIGHT: 61 IN | BODY MASS INDEX: 28.7 KG/M2 | DIASTOLIC BLOOD PRESSURE: 74 MMHG | WEIGHT: 152 LBS | SYSTOLIC BLOOD PRESSURE: 160 MMHG | OXYGEN SATURATION: 97 % | HEART RATE: 50 BPM

## 2022-01-12 DIAGNOSIS — I10 ESSENTIAL HYPERTENSION: Primary | ICD-10-CM

## 2022-01-12 DIAGNOSIS — I89.0 LYMPHEDEMA: ICD-10-CM

## 2022-01-12 DIAGNOSIS — N18.9 CHRONIC KIDNEY DISEASE, UNSPECIFIED CKD STAGE: ICD-10-CM

## 2022-01-12 PROCEDURE — 93005 ELECTROCARDIOGRAM TRACING: CPT | Performed by: SPECIALIST

## 2022-01-12 PROCEDURE — 1090F PRES/ABSN URINE INCON ASSESS: CPT | Performed by: SPECIALIST

## 2022-01-12 PROCEDURE — 93010 ELECTROCARDIOGRAM REPORT: CPT | Performed by: SPECIALIST

## 2022-01-12 PROCEDURE — 1101F PT FALLS ASSESS-DOCD LE1/YR: CPT | Performed by: SPECIALIST

## 2022-01-12 PROCEDURE — G8417 CALC BMI ABV UP PARAM F/U: HCPCS | Performed by: SPECIALIST

## 2022-01-12 PROCEDURE — G8536 NO DOC ELDER MAL SCRN: HCPCS | Performed by: SPECIALIST

## 2022-01-12 PROCEDURE — G8427 DOCREV CUR MEDS BY ELIG CLIN: HCPCS | Performed by: SPECIALIST

## 2022-01-12 PROCEDURE — G8432 DEP SCR NOT DOC, RNG: HCPCS | Performed by: SPECIALIST

## 2022-01-12 PROCEDURE — G0463 HOSPITAL OUTPT CLINIC VISIT: HCPCS | Performed by: SPECIALIST

## 2022-01-12 PROCEDURE — 99214 OFFICE O/P EST MOD 30 MIN: CPT | Performed by: SPECIALIST

## 2022-01-12 NOTE — PROGRESS NOTES
Estelle Rodriges is a 80 y.o. female    Visit Vitals  BP (!) 160/74 (BP 1 Location: Left upper arm, BP Patient Position: Sitting, BP Cuff Size: Adult)   Pulse (!) 50   Ht 5' 1\" (1.549 m)   Wt 152 lb (68.9 kg)   SpO2 97%   BMI 28.72 kg/m²       Chief Complaint   Patient presents with    Hypertension    Other     CKD    Other     LYMPHEDEMA       Chest pain NO  SOB NO  Dizziness NO  Swelling LEGS  Recent hospital visit NO  Refills NO  COVID VACCINE STATUS YES  HAD COVID?  NO

## 2022-01-12 NOTE — PROGRESS NOTES
Kaylee Rossi MD. [facility] [place]              Patient: Jeovanny Sanchez  : 1935      Today's Date: 2022          HISTORY OF PRESENT ILLNESS:     History of Present Illness:  BP has been OK. She feels well. No exertional CP or SOB. No orthopnea. She feels fine. Has some \"fluttering\" for a second or so. She has lost 30 lbs since last year taking care of sister. PAST MEDICAL HISTORY:     Past Medical History:   Diagnosis Date    CKD (chronic kidney disease)     Sees Dr. Tico Weiss Dyslipidemia     GERD (gastroesophageal reflux disease)     Gout     HTN (hypertension)     Lymphedema     wears wraps       Past Surgical History:   Procedure Laterality Date    HX BREAST BIOPSY Bilateral 30+ yrs    negative pathology    HX BREAST LUMPECTOMY Right     pt said many years ago they went in and removed a lump on both breasts     HX BREAST LUMPECTOMY Left          MEDICATIONS:     Current Outpatient Medications   Medication Sig Dispense Refill    ascorbic acid, vitamin C, (Vitamin C) 250 mg tablet Take  by mouth.  pantoprazole (PROTONIX) 40 mg granules for oral suspension 40 mg daily.  cholecalciferol (VITAMIN D3) 25 mcg (1,000 unit) cap Take  by mouth daily.  allopurinoL (ZYLOPRIM) 100 mg tablet Take  by mouth daily.  ezetimibe (ZETIA) 10 mg tablet Take  by mouth.  metoprolol succinate (TOPROL-XL) 50 mg XL tablet Take 50 mg by mouth daily.  aspirin delayed-release 81 mg tablet Take  by mouth daily.          No Known Allergies          SOCIAL HISTORY:     Social History     Tobacco Use    Smoking status: Former Smoker     Quit date:      Years since quittin.0    Smokeless tobacco: Never Used   Substance Use Topics    Alcohol use: Not Currently    Drug use: Never         FAMILY HISTORY:     Family History   Problem Relation Age of Onset    Breast Cancer Maternal Aunt     Heart Disease Mother            REVIEW OF SYMPTOMS:     Review of Symptoms:  Constitutional: Negative for fever, chills  HEENT: Negative for nosebleeds, tinnitus, and vision changes. Respiratory: Negative for cough, wheezing  Cardiovascular: Negative for orthopnea, claudication, syncope, and PND. Gastrointestinal: Negative for abdominal pain, diarrhea, melena. Genitourinary: Negative for dysuria  Musculoskeletal: Negative for myalgias. Skin: Negative for rash  Heme: No problems bleeding. Neurological: Negative for speech change and focal weakness. PHYSICAL EXAM:     Physical Exam:  Visit Vitals  BP (!) 160/74 (BP 1 Location: Left upper arm, BP Patient Position: Sitting, BP Cuff Size: Adult)   Pulse (!) 50   Ht 5' 1\" (1.549 m)   Wt 152 lb (68.9 kg)   SpO2 97%   BMI 28.72 kg/m²     Patient appears generally well, mood and affect are appropriate and pleasant. HEENT:  Hearing intact, non-icteric, normocephalic, atraumatic. Neck Exam: Supple    Lung Exam: Clear to auscultation, even breath sounds. Cardiac Exam: Regular rate and rhythm with no murmur or rub  Abdomen: Soft, non-tender,   Extremities: Moves all ext well. + mild chronic lower extremity edema. MSKTL: Overall good ROM ext  Skin: No significant rashes  Psych: Appropriate affect  Neuro - Grossly intact      LABS / OTHER STUDIES reviewed:     Labs 5/3/21 - Cr 1.85, Hgb 11, K 4.6      Lab Results   Component Value Date/Time    Sodium 142 06/29/2021 11:02 AM    Potassium 4.3 06/29/2021 11:02 AM    Chloride 98 06/29/2021 11:02 AM    CO2 26 06/29/2021 11:02 AM    Glucose 101 (H) 06/29/2021 11:02 AM    BUN 31 (H) 06/29/2021 11:02 AM    Creatinine 2.59 (H) 06/29/2021 11:02 AM    BUN/Creatinine ratio 12 06/29/2021 11:02 AM    GFR est AA 19 (L) 06/29/2021 11:02 AM    GFR est non-AA 16 (L) 06/29/2021 11:02 AM    Calcium 10.0 06/29/2021 11:02 AM           CARDIAC DIAGNOSTICS:     Cardiac Evaluation Includes:  I reviewed the results below.      EKG 9/23/20 - sinus quyen, incomplete RBBB, non-specific T wave abn   EKG 1/12/22 - NSR, RBBB        Lexiscan Cardiolite 10/6/20 - Normal MPI, LVEF 68%      Echo 10/6/20 - LVEF 55-60%, grade 1 diastology, mild LAE          ASSESSMENT AND PLAN:     Assessment and Plan:    1) HTN  - Was started on chlorthalidone by Nephrology on May 7, 2021. On 6/4/21 labs show Cr increased from 1.85 to 2.47. On 6/21/21 ---> Had her stop chlorthalidone as Cr was 2.5. Advised to see Nephrology to FU on kidney function and HTN.   - On 7/21/21 - she is doing OK and BP seems OK (BP at home runs 122/60 or so) -- cont Toprol XL  - On 1/12/22 - BP high today - asked her to follow BP more closely at home and we'll adjust meds if BP is high (goal < 135/85 at home)      2) Atypical chest pain   - Lexiscan Cardiolite 10/6/20 - Normal MPI, LVEF 68%   - Echo 10/6/20 - LVEF 55-60%, grade 1 diastology, mild LAE   - She has a \"thump\" in chest for a second - sounds like she may be having PVC's -- I discussed checking a heart monitor and she decided to get one later if problems worsen - symptoms sound benign  - Has a slight heart thump 2-3 x a day (just like a light poke).     - Overall she is active and doing well without anginal complaints      3) Lymphedema   - she does leg wraps and legs look good      4) CKD  - Sees Nephrology (Dr. Tj Lowery)   - labs followed by Nephrology      5) Dyslipidemia   - Has been on Zetia for years (denies problems with a statin)   - labs followed by PCP     6) See me in 1 month (her preference)      She lives in [place].  Sister (dementia) lives with her.   at 36 yo.  Has 2 kids and 3 grand kids.  Worked at [address] [place] [address] and [employer] staff.           Letha Ho MD, [address], [facility] [address].  [address], [address] [place] [address]  Ph: [phone]   Ph [phone]

## 2022-02-23 ENCOUNTER — OFFICE VISIT (OUTPATIENT)
Dept: CARDIOLOGY CLINIC | Age: 87
End: 2022-02-23
Payer: MEDICARE

## 2022-02-23 VITALS
HEIGHT: 61 IN | BODY MASS INDEX: 27.75 KG/M2 | OXYGEN SATURATION: 97 % | SYSTOLIC BLOOD PRESSURE: 138 MMHG | WEIGHT: 147 LBS | DIASTOLIC BLOOD PRESSURE: 80 MMHG | HEART RATE: 44 BPM

## 2022-02-23 DIAGNOSIS — I10 ESSENTIAL HYPERTENSION: Primary | ICD-10-CM

## 2022-02-23 DIAGNOSIS — N18.9 CHRONIC KIDNEY DISEASE, UNSPECIFIED CKD STAGE: ICD-10-CM

## 2022-02-23 DIAGNOSIS — I89.0 LYMPHEDEMA: ICD-10-CM

## 2022-02-23 PROCEDURE — 99214 OFFICE O/P EST MOD 30 MIN: CPT | Performed by: SPECIALIST

## 2022-02-23 PROCEDURE — G8417 CALC BMI ABV UP PARAM F/U: HCPCS | Performed by: SPECIALIST

## 2022-02-23 PROCEDURE — G8536 NO DOC ELDER MAL SCRN: HCPCS | Performed by: SPECIALIST

## 2022-02-23 PROCEDURE — 1090F PRES/ABSN URINE INCON ASSESS: CPT | Performed by: SPECIALIST

## 2022-02-23 PROCEDURE — G8432 DEP SCR NOT DOC, RNG: HCPCS | Performed by: SPECIALIST

## 2022-02-23 PROCEDURE — G0463 HOSPITAL OUTPT CLINIC VISIT: HCPCS | Performed by: SPECIALIST

## 2022-02-23 PROCEDURE — 1101F PT FALLS ASSESS-DOCD LE1/YR: CPT | Performed by: SPECIALIST

## 2022-02-23 PROCEDURE — G8427 DOCREV CUR MEDS BY ELIG CLIN: HCPCS | Performed by: SPECIALIST

## 2022-02-23 RX ORDER — AMLODIPINE BESYLATE 2.5 MG/1
2.5 TABLET ORAL DAILY
Qty: 90 TABLET | Refills: 3 | Status: SHIPPED | OUTPATIENT
Start: 2022-02-23

## 2022-02-23 RX ORDER — METOPROLOL SUCCINATE 25 MG/1
25 TABLET, EXTENDED RELEASE ORAL DAILY
Qty: 90 TABLET | Refills: 3 | Status: SHIPPED | OUTPATIENT
Start: 2022-02-23

## 2022-02-23 NOTE — PROGRESS NOTES
Maddie Muir MD. [facility] [place]              Patient: Coni Upton  : 1935      Today's Date: 2022          HISTORY OF PRESENT ILLNESS:     History of Present Illness:  BP has been OK. She feels well. No exertional CP or SOB (has chronic atypical CP for < 1 sec). No orthopnea. She feels fine. Has some \"fluttering\" for a second or so. She has lost 30 lbs since last year taking care of sister. 's mostly at home. PAST MEDICAL HISTORY:     Past Medical History:   Diagnosis Date    CKD (chronic kidney disease)     Sees Dr. Willa Murrell Dyslipidemia     GERD (gastroesophageal reflux disease)     Gout     HTN (hypertension)     Lymphedema     wears wraps       Past Surgical History:   Procedure Laterality Date    HX BREAST BIOPSY Bilateral 30+ yrs    negative pathology    HX BREAST LUMPECTOMY Right     pt said many years ago they went in and removed a lump on both breasts     HX BREAST LUMPECTOMY Left          MEDICATIONS:     Current Outpatient Medications   Medication Sig Dispense Refill    ascorbic acid, vitamin C, (Vitamin C) 250 mg tablet Take  by mouth.  pantoprazole (PROTONIX) 40 mg granules for oral suspension 40 mg daily.  cholecalciferol (VITAMIN D3) 25 mcg (1,000 unit) cap Take  by mouth daily.  allopurinoL (ZYLOPRIM) 100 mg tablet Take  by mouth daily.  ezetimibe (ZETIA) 10 mg tablet Take  by mouth.  metoprolol succinate (TOPROL-XL) 50 mg XL tablet Take 50 mg by mouth daily.  aspirin delayed-release 81 mg tablet Take  by mouth daily.          No Known Allergies          SOCIAL HISTORY:     Social History     Tobacco Use    Smoking status: Former Smoker     Quit date:      Years since quittin.1    Smokeless tobacco: Never Used   Substance Use Topics    Alcohol use: Not Currently    Drug use: Never         FAMILY HISTORY:     Family History   Problem Relation Age of Onset    Breast Cancer Maternal Aunt     Heart Disease Mother            REVIEW OF SYMPTOMS:     Review of Symptoms:  Constitutional: Negative for fever, chills  HEENT: Negative for nosebleeds, tinnitus, and vision changes. Respiratory: Negative for cough, wheezing  Cardiovascular: Negative for orthopnea, claudication, syncope, and PND. Gastrointestinal: Negative for abdominal pain, diarrhea, melena. Genitourinary: Negative for dysuria  Musculoskeletal: Negative for myalgias. Skin: Negative for rash  Heme: No problems bleeding. Neurological: Negative for speech change and focal weakness. PHYSICAL EXAM:     Physical Exam:  Visit Vitals  /80 (BP 1 Location: Left upper arm, BP Patient Position: Sitting, BP Cuff Size: Adult)   Pulse (!) 44   Ht 5' 1\" (1.549 m)   Wt 147 lb (66.7 kg)   SpO2 97%   BMI 27.78 kg/m²     Patient appears generally well, mood and affect are appropriate and pleasant. HEENT:  Hearing intact, non-icteric, normocephalic, atraumatic. Neck Exam: Supple    Lung Exam: Clear to auscultation, even breath sounds. Cardiac Exam: Regular rate and rhythm with no murmur or rub  Abdomen: Soft, non-tender,   Extremities: Moves all ext well. + mild chronic lower extremity edema. MSKTL: Overall good ROM ext  Skin: No significant rashes  Psych: Appropriate affect  Neuro - Grossly intact      LABS / OTHER STUDIES reviewed:     Labs 5/3/21 - Cr 1.85, Hgb 11, K 4.6      Lab Results   Component Value Date/Time    Sodium 142 06/29/2021 11:02 AM    Potassium 4.3 06/29/2021 11:02 AM    Chloride 98 06/29/2021 11:02 AM    CO2 26 06/29/2021 11:02 AM    Glucose 101 (H) 06/29/2021 11:02 AM    BUN 31 (H) 06/29/2021 11:02 AM    Creatinine 2.59 (H) 06/29/2021 11:02 AM    BUN/Creatinine ratio 12 06/29/2021 11:02 AM    GFR est AA 19 (L) 06/29/2021 11:02 AM    GFR est non-AA 16 (L) 06/29/2021 11:02 AM    Calcium 10.0 06/29/2021 11:02 AM           CARDIAC DIAGNOSTICS:     Cardiac Evaluation Includes:  I reviewed the results below.      EKG 9/23/20 - sinus quyen, incomplete RBBB, non-specific T wave abn   EKG 1/12/22 - NSR, RBBB        Lexiscan Cardiolite 10/6/20 - Normal MPI, LVEF 68%      Echo 10/6/20 - LVEF 55-60%, grade 1 diastology, mild LAE          ASSESSMENT AND PLAN:     Assessment and Plan:    1) HTN  - Was started on chlorthalidone by Nephrology on May 7, 2021. On 6/4/21 labs show Cr increased from 1.85 to 2.47. On 6/21/21 ---> Had her stop chlorthalidone as Cr was 2.5. Advised to see Nephrology to FU on kidney function and HTN.   - On 2/23/22 - 's at home with pulse in 50's ---> will cut metoprolol dose in half as HR runs slow and add amlodipine 2.5 mg daily - goal BP < 130/80      2) Atypical chest pain   - Lexiscan Cardiolite 10/6/20 - Normal MPI, LVEF 68%   - Echo 10/6/20 - LVEF 55-60%, grade 1 diastology, mild LAE   - She has a \"thump\" in chest for a second - sounds like she may be having PVC's -- I discussed checking a heart monitor and she decided to get one later if problems worsen - symptoms sound benign  - Has a slight heart thump 2-3 x a day (just like a light poke).     - Overall she is active and doing well without anginal complaints      3) Lymphedema   - she does leg wraps and legs look good      4) CKD  - Sees Nephrology (Dr. Dea Fuller)   - labs followed by Nephrology      5) Dyslipidemia   - Has been on Zetia for years (denies problems with a statin)   - labs followed by PCP     6) See me in 1 month (her preference)      She lives in [place].  Sister (dementia) lives with her.   at 38 yo.  Has 2 kids and 3 grand kids.  Worked at [employer] and [employer] staff.           Lincoln Piper MD, [place] [id number] [facility] [address].  [address], [address]  [place], [address] [place] [address]  Ph: [phone]   Ph [phone]

## 2022-02-23 NOTE — PROGRESS NOTES
Zuly López is a 80 y.o. female    Visit Vitals  /80 (BP 1 Location: Left upper arm, BP Patient Position: Sitting, BP Cuff Size: Adult)   Pulse (!) 44   Ht 5' 1\" (1.549 m)   Wt 147 lb (66.7 kg)   SpO2 97%   BMI 27.78 kg/m²       Chief Complaint   Patient presents with    Follow-up     6 weeks    Hypertension    Other     Lymphedema    Chronic Kidney Disease       Chest pain YES  SOB NO  Dizziness NO  Swelling NO  Recent hospital visit NO  Refills NO  COVID VACCINE STATUS YES  HAD COVID?  NO

## 2022-04-11 ENCOUNTER — TRANSCRIBE ORDER (OUTPATIENT)
Dept: SCHEDULING | Age: 87
End: 2022-04-11

## 2022-04-11 DIAGNOSIS — Z12.31 SCREENING MAMMOGRAM FOR HIGH-RISK PATIENT: Primary | ICD-10-CM

## 2022-05-13 ENCOUNTER — HOSPITAL ENCOUNTER (OUTPATIENT)
Dept: MAMMOGRAPHY | Age: 87
Discharge: HOME | End: 2022-05-13
Attending: INTERNAL MEDICINE
Payer: MEDICARE

## 2022-05-13 DIAGNOSIS — Z12.31 SCREENING MAMMOGRAM FOR HIGH-RISK PATIENT: ICD-10-CM

## 2022-05-13 PROCEDURE — 77067 SCR MAMMO BI INCL CAD: CPT

## 2022-05-25 ENCOUNTER — OFFICE VISIT (OUTPATIENT)
Dept: CARDIOLOGY CLINIC | Age: 87
End: 2022-05-25
Payer: MEDICARE

## 2022-05-25 VITALS
WEIGHT: 144 LBS | BODY MASS INDEX: 27.19 KG/M2 | HEIGHT: 61 IN | OXYGEN SATURATION: 97 % | DIASTOLIC BLOOD PRESSURE: 68 MMHG | HEART RATE: 51 BPM | SYSTOLIC BLOOD PRESSURE: 170 MMHG

## 2022-05-25 DIAGNOSIS — I10 ESSENTIAL HYPERTENSION: Primary | ICD-10-CM

## 2022-05-25 DIAGNOSIS — N18.9 CHRONIC KIDNEY DISEASE, UNSPECIFIED CKD STAGE: ICD-10-CM

## 2022-05-25 PROCEDURE — G8427 DOCREV CUR MEDS BY ELIG CLIN: HCPCS | Performed by: SPECIALIST

## 2022-05-25 PROCEDURE — G0463 HOSPITAL OUTPT CLINIC VISIT: HCPCS | Performed by: SPECIALIST

## 2022-05-25 PROCEDURE — 1123F ACP DISCUSS/DSCN MKR DOCD: CPT | Performed by: SPECIALIST

## 2022-05-25 PROCEDURE — 1101F PT FALLS ASSESS-DOCD LE1/YR: CPT | Performed by: SPECIALIST

## 2022-05-25 PROCEDURE — G8536 NO DOC ELDER MAL SCRN: HCPCS | Performed by: SPECIALIST

## 2022-05-25 PROCEDURE — 1090F PRES/ABSN URINE INCON ASSESS: CPT | Performed by: SPECIALIST

## 2022-05-25 PROCEDURE — 99214 OFFICE O/P EST MOD 30 MIN: CPT | Performed by: SPECIALIST

## 2022-05-25 PROCEDURE — G8417 CALC BMI ABV UP PARAM F/U: HCPCS | Performed by: SPECIALIST

## 2022-05-25 PROCEDURE — G8510 SCR DEP NEG, NO PLAN REQD: HCPCS | Performed by: SPECIALIST

## 2022-05-25 NOTE — PROGRESS NOTES
Chief Complaint   Patient presents with    Follow-up     3 months    Hypertension     Vitals:    05/25/22 1304 05/25/22 1311   BP: (!) 160/70 (!) 170/68   BP 1 Location: Left upper arm Right upper arm   BP Patient Position: Sitting Sitting   BP Cuff Size: Large adult Large adult   Pulse: (!) 51    Height: 5' 1\" (1.549 m)    Weight: 144 lb (65.3 kg)    SpO2: 97%      Chest pain denied   SOB denied   Palpitations denied   Swelling in hands/feet denied   Dizziness denied   Recent hospital stays denied   Refills denied     Home BP has been good; gets white coat syndrome.

## 2022-05-25 NOTE — PROGRESS NOTES
Ras Humphries MD. [facility] [place]              Patient: Odalis Ayala  : 1935      Today's Date: 2022          HISTORY OF PRESENT ILLNESS:     History of Present Illness:  BP has been OK. She feels well. No exertional CP or SOB (has chronic atypical CP for < 1 sec). No orthopnea. She feels fine. Has some \"fluttering\" for a second or so. She has lost 30 lbs since last year taking care of sister. 's mostly at home. PAST MEDICAL HISTORY:     Past Medical History:   Diagnosis Date    CKD (chronic kidney disease)     Sees Dr. Gracy Suresh Dyslipidemia     GERD (gastroesophageal reflux disease)     Gout     HTN (hypertension)     Lymphedema     wears wraps       Past Surgical History:   Procedure Laterality Date    HX BREAST BIOPSY Bilateral 30+ yrs    negative pathology    HX BREAST LUMPECTOMY Right     pt said many years ago they went in and removed a lump on both breasts     HX BREAST LUMPECTOMY Left          MEDICATIONS:     Current Outpatient Medications   Medication Sig Dispense Refill    metoprolol succinate (TOPROL-XL) 25 mg XL tablet Take 1 Tablet by mouth daily. 90 Tablet 3    amLODIPine (NORVASC) 2.5 mg tablet Take 1 Tablet by mouth daily. 90 Tablet 3    ascorbic acid, vitamin C, (Vitamin C) 250 mg tablet Take  by mouth.  pantoprazole (PROTONIX) 40 mg granules for oral suspension 40 mg daily.  cholecalciferol (VITAMIN D3) 25 mcg (1,000 unit) cap Take  by mouth daily.  allopurinoL (ZYLOPRIM) 100 mg tablet Take  by mouth daily.  ezetimibe (ZETIA) 10 mg tablet Take  by mouth.  aspirin delayed-release 81 mg tablet Take  by mouth daily.          No Known Allergies          SOCIAL HISTORY:     Social History     Tobacco Use    Smoking status: Former Smoker     Quit date:      Years since quittin.4    Smokeless tobacco: Never Used   Substance Use Topics    Alcohol use: Not Currently    Drug use: Never         FAMILY HISTORY: Family History   Problem Relation Age of Onset    Breast Cancer Maternal Aunt     Heart Disease Mother            REVIEW OF SYMPTOMS:     Review of Symptoms:  Constitutional: Negative for fever, chills  HEENT: Negative for nosebleeds, tinnitus, and vision changes. Respiratory: Negative for cough, wheezing  Cardiovascular: Negative for orthopnea, claudication, syncope, and PND. Gastrointestinal: Negative for abdominal pain, diarrhea, melena. Genitourinary: Negative for dysuria  Musculoskeletal: Negative for myalgias. Skin: Negative for rash  Heme: No problems bleeding. Neurological: Negative for speech change and focal weakness. PHYSICAL EXAM:     Physical Exam:  Visit Vitals  BP (!) 170/68 (BP 1 Location: Right upper arm, BP Patient Position: Sitting, BP Cuff Size: Large adult)   Pulse (!) 51   Ht 5' 1\" (1.549 m)   Wt 144 lb (65.3 kg)   SpO2 97%   BMI 27.21 kg/m²     Patient appears generally well, mood and affect are appropriate and pleasant. HEENT:  Hearing intact, non-icteric, normocephalic, atraumatic. Neck Exam: Supple    Lung Exam: Clear to auscultation, even breath sounds. Cardiac Exam: Regular rate and rhythm with no murmur or rub  Abdomen: Soft, non-tender,   Extremities: Moves all ext well. + mild chronic lower extremity edema.   MSKTL: Overall good ROM ext  Skin: No significant rashes  Psych: Appropriate affect  Neuro - Grossly intact      LABS / OTHER STUDIES reviewed:     Labs 5/3/21 - Cr 1.85, Hgb 11, K 4.6      Lab Results   Component Value Date/Time    Sodium 142 06/29/2021 11:02 AM    Potassium 4.3 06/29/2021 11:02 AM    Chloride 98 06/29/2021 11:02 AM    CO2 26 06/29/2021 11:02 AM    Glucose 101 (H) 06/29/2021 11:02 AM    BUN 31 (H) 06/29/2021 11:02 AM    Creatinine 2.59 (H) 06/29/2021 11:02 AM    BUN/Creatinine ratio 12 06/29/2021 11:02 AM    GFR est AA 19 (L) 06/29/2021 11:02 AM    GFR est non-AA 16 (L) 06/29/2021 11:02 AM    Calcium 10.0 06/29/2021 11:02 AM CARDIAC DIAGNOSTICS:     Cardiac Evaluation Includes:  I reviewed the results below. EKG 9/23/20 - sinus quyen, incomplete RBBB, non-specific T wave abn   EKG 1/12/22 - NSR, RBBB        Lexiscan Cardiolite 10/6/20 - Normal MPI, LVEF 68%      Echo 10/6/20 - LVEF 55-60%, grade 1 diastology, mild LAE          ASSESSMENT AND PLAN:     Assessment and Plan:    1) HTN  - Was started on chlorthalidone by Nephrology on May 7, 2021. On 6/4/21 labs show Cr increased from 1.85 to 2.47. On 6/21/21 ---> Had her stop chlorthalidone as Cr was 2.5. Advised to see Nephrology to FU on kidney function and HTN.   - On 2/23/22 - 's at home with pulse in 50's ---> will cut metoprolol dose in half as HR runs slow and add amlodipine 2.5 mg daily - goal BP < 130/80   - ON 5/25/22 - she says BP OK at home -- -130's (although BP high in the office). Will continue regimen as is and she will follow BP at home.        2) Atypical chest pain   - Lexiscan Cardiolite 10/6/20 - Normal MPI, LVEF 68%   - Echo 10/6/20 - LVEF 55-60%, grade 1 diastology, mild LAE   - She has a \"thump\" in chest for a second - sounds like she may be having PVC's -- I discussed checking a heart monitor and she decided to get one later if problems worsen - symptoms sound benign  - Has a slight heart thump 2-3 x a day (just like a light poke).     - Overall she is active and doing well without anginal complaints      3) Lymphedema   - she does leg wraps and legs look good      4) CKD  - Sees Nephrology (Dr. Zayra Montano)   - labs followed by Nephrology      5) Dyslipidemia   - Has been on Zetia for years (denies problems with a statin)   - labs followed by PCP     6) See me in 6 months.    She lives in [place].  Sister (dementia) lives with her. Joycelyn Bosch at 38 yo.  Has 2 kids and 3 grand kids.  Worked at [employer] and [employer] staff.           Marco Steiner MD, [address] [facility] [address], [address] [place] [address] [facility]  [address], [address]  [place], [address] [place] [address]  Ph: [phone]    [phone]

## 2022-11-16 ENCOUNTER — OFFICE VISIT (OUTPATIENT)
Dept: CARDIOLOGY CLINIC | Age: 87
End: 2022-11-16
Payer: MEDICARE

## 2022-11-16 VITALS
WEIGHT: 138 LBS | HEIGHT: 61 IN | HEART RATE: 58 BPM | DIASTOLIC BLOOD PRESSURE: 68 MMHG | SYSTOLIC BLOOD PRESSURE: 156 MMHG | BODY MASS INDEX: 26.06 KG/M2 | OXYGEN SATURATION: 98 %

## 2022-11-16 DIAGNOSIS — N18.9 CHRONIC KIDNEY DISEASE, UNSPECIFIED CKD STAGE: ICD-10-CM

## 2022-11-16 DIAGNOSIS — I10 ESSENTIAL HYPERTENSION: Primary | ICD-10-CM

## 2022-11-16 DIAGNOSIS — I89.0 LYMPHEDEMA: ICD-10-CM

## 2022-11-16 PROCEDURE — 1101F PT FALLS ASSESS-DOCD LE1/YR: CPT | Performed by: SPECIALIST

## 2022-11-16 PROCEDURE — 99214 OFFICE O/P EST MOD 30 MIN: CPT | Performed by: SPECIALIST

## 2022-11-16 PROCEDURE — G8432 DEP SCR NOT DOC, RNG: HCPCS | Performed by: SPECIALIST

## 2022-11-16 PROCEDURE — 1123F ACP DISCUSS/DSCN MKR DOCD: CPT | Performed by: SPECIALIST

## 2022-11-16 PROCEDURE — 1090F PRES/ABSN URINE INCON ASSESS: CPT | Performed by: SPECIALIST

## 2022-11-16 PROCEDURE — G0463 HOSPITAL OUTPT CLINIC VISIT: HCPCS | Performed by: SPECIALIST

## 2022-11-16 PROCEDURE — G8536 NO DOC ELDER MAL SCRN: HCPCS | Performed by: SPECIALIST

## 2022-11-16 PROCEDURE — G8417 CALC BMI ABV UP PARAM F/U: HCPCS | Performed by: SPECIALIST

## 2022-11-16 PROCEDURE — G8427 DOCREV CUR MEDS BY ELIG CLIN: HCPCS | Performed by: SPECIALIST

## 2022-11-16 RX ORDER — AMLODIPINE BESYLATE 5 MG/1
5 TABLET ORAL DAILY
Qty: 90 TABLET | Refills: 3 | Status: SHIPPED | OUTPATIENT
Start: 2022-11-16

## 2022-11-16 NOTE — PROGRESS NOTES
Rene Valdez MD. [facility] [place]              Patient: Porfirio Hale  : 1935      Today's Date: 2022          HISTORY OF PRESENT ILLNESS:     History of Present Illness:  BP has been OK. She feels well. No exertional CP or sig SOB (has chronic atypical CP for < 1 sec). No orthopnea. She feels fine. Has some \"fluttering\" for a second or so. Lost 45 lbs with stress of caring for sister. She has not checked BP at home lately. PAST MEDICAL HISTORY:     Past Medical History:   Diagnosis Date    CKD (chronic kidney disease)     Sees Dr. Caity Marquez     Dyslipidemia     GERD (gastroesophageal reflux disease)     Gout     HTN (hypertension)     Lymphedema     wears wraps       Past Surgical History:   Procedure Laterality Date    HX BREAST BIOPSY Bilateral 30+ yrs    negative pathology    HX BREAST LUMPECTOMY Right     pt said many years ago they went in and removed a lump on both breasts     HX BREAST LUMPECTOMY Left          MEDICATIONS:     Current Outpatient Medications   Medication Sig Dispense Refill    metoprolol succinate (TOPROL-XL) 25 mg XL tablet Take 1 Tablet by mouth daily. 90 Tablet 3    amLODIPine (NORVASC) 2.5 mg tablet Take 1 Tablet by mouth daily. 90 Tablet 3    ascorbic acid, vitamin C, (VITAMIN C) 250 mg tablet Take  by mouth.      pantoprazole (PROTONIX) 40 mg granules for oral suspension 40 mg daily. cholecalciferol (VITAMIN D3) 25 mcg (1,000 unit) cap Take  by mouth daily. allopurinoL (ZYLOPRIM) 100 mg tablet Take  by mouth daily. ezetimibe (ZETIA) 10 mg tablet Take  by mouth. aspirin delayed-release 81 mg tablet Take  by mouth daily.          Allergies   Allergen Reactions    Ibuprofen Other (comments)     D/t kidneys             SOCIAL HISTORY:     Social History     Tobacco Use    Smoking status: Former     Types: Cigarettes     Quit date:      Years since quittin.9    Smokeless tobacco: Never   Substance Use Topics    Alcohol use: Not Currently    Drug use: Never         FAMILY HISTORY:     Family History   Problem Relation Age of Onset    Breast Cancer Maternal Aunt     Heart Disease Mother            REVIEW OF SYMPTOMS:     Review of Symptoms:  Constitutional: Negative for fever, chills  HEENT: Negative for nosebleeds, tinnitus, and vision changes. Respiratory: Negative for cough, wheezing  Cardiovascular: Negative for orthopnea, claudication, syncope, and PND. Gastrointestinal: Negative for abdominal pain, diarrhea, melena. Genitourinary: Negative for dysuria  Musculoskeletal: Negative for myalgias. Skin: Negative for rash  Heme: No problems bleeding. Neurological: Negative for speech change and focal weakness.   + HA          PHYSICAL EXAM:     Physical Exam:  Visit Vitals  BP (!) 156/68 (BP 1 Location: Left upper arm, BP Patient Position: Sitting, BP Cuff Size: Adult)   Pulse (!) 58   Ht 5' 1\" (1.549 m)   Wt 138 lb (62.6 kg)   SpO2 98%   BMI 26.07 kg/m²     Patient appears generally well, mood and affect are appropriate and pleasant. HEENT:  Hearing intact, non-icteric, normocephalic, atraumatic. Neck Exam: Supple    Lung Exam: Clear to auscultation, even breath sounds. Cardiac Exam: Regular rate and rhythm with no murmur or rub  Abdomen: Soft, non-tender,   Extremities: Moves all ext well. + mild chronic lower extremity edema.   MSKTL: Overall good ROM ext  Skin: No significant rashes  Psych: Appropriate affect  Neuro - Grossly intact      LABS / OTHER STUDIES reviewed:     Labs 5/3/21 - Cr 1.85, Hgb 11, K 4.6      Lab Results   Component Value Date/Time    Sodium 142 06/29/2021 11:02 AM    Potassium 4.3 06/29/2021 11:02 AM    Chloride 98 06/29/2021 11:02 AM    CO2 26 06/29/2021 11:02 AM    Glucose 101 (H) 06/29/2021 11:02 AM    BUN 31 (H) 06/29/2021 11:02 AM    Creatinine 2.59 (H) 06/29/2021 11:02 AM    BUN/Creatinine ratio 12 06/29/2021 11:02 AM    GFR est AA 19 (L) 06/29/2021 11:02 AM    GFR est non-AA 16 (L) 06/29/2021 11:02 AM    Calcium 10.0 06/29/2021 11:02 AM           CARDIAC DIAGNOSTICS:     Cardiac Evaluation Includes:  I reviewed the results below. EKG 9/23/20 - sinus quyen, incomplete RBBB, non-specific T wave abn   EKG 1/12/22 - NSR, RBBB        Lexiscan Cardiolite 10/6/20 - Normal MPI, LVEF 68%      Echo 10/6/20 - LVEF 55-60%, grade 1 diastology, mild LAE          ASSESSMENT AND PLAN:     Assessment and Plan:    1) HTN  - Was started on chlorthalidone by Nephrology on May 7, 2021. On 6/4/21 labs show Cr increased from 1.85 to 2.47. On 6/21/21 ---> Had her stop chlorthalidone as Cr was 2.5. Advised to see Nephrology to FU on kidney function and HTN.   - On 11/16/22 - BP is high ---> will increase norvasc to 5 mg and have her follow BP at home ---> reassess in one month    2) Atypical chest pain   - Lexiscan Cardiolite 10/6/20 - Normal MPI, LVEF 68%   - Echo 10/6/20 - LVEF 55-60%, grade 1 diastology, mild LAE   - She has a \"thump\" in chest for a second - sounds like she may be having PVC's -- I discussed checking a heart monitor and she decided to get one later if problems worsen - symptoms sound benign  - Has a slight heart thump 2-3 x a day (just like a light poke). - Overall she is active and doing well without anginal complaints      3) Lymphedema   - she does leg wraps and legs look good      4) CKD  - Sees Nephrology (Dr. Brandi Jimenez partner)   - labs followed by Nephrology      5) Dyslipidemia   - Has been on Zetia for years (denies problems with a statin)   - labs followed by PCP     6) See me in 1 month   She lives in [place]. Sister (dementia) passed away in 2022.  at 38 yo. Has 2 kids and 3 grand kids. Worked at [employer] and [employer]. Teodora Moreno MD, [id number]  [address] [facility], [address]  [address]  [address] . [address], [address]  [place], [address] [place] [address]  Ph: [phone]    [phone]

## 2022-11-16 NOTE — PROGRESS NOTES
Chief Complaint   Patient presents with    Follow-up     6 months    Hypertension     Vitals:    11/16/22 1303 11/16/22 1309   BP: (!) 160/68 (!) 156/68   BP 1 Location: Right upper arm Left upper arm   BP Patient Position: Sitting Sitting   BP Cuff Size: Adult Adult   Pulse: (!) 58    Height: 5' 1\" (1.549 m)    Weight: 138 lb (62.6 kg)    SpO2: 98%      Chest pain denied   SOB denied   Palpitations more often now  Swelling in hands/feet denied   Dizziness denied   Recent hospital stays denied   Refills denied

## 2023-01-03 ENCOUNTER — TRANSCRIBE ORDER (OUTPATIENT)
Dept: GENERAL RADIOLOGY | Age: 88
End: 2023-01-03

## 2023-01-03 ENCOUNTER — HOSPITAL ENCOUNTER (OUTPATIENT)
Dept: GENERAL RADIOLOGY | Age: 88
Discharge: HOME | End: 2023-01-03
Payer: MEDICARE

## 2023-01-03 ENCOUNTER — OFFICE VISIT (OUTPATIENT)
Dept: CARDIOLOGY CLINIC | Age: 88
End: 2023-01-03
Payer: MEDICARE

## 2023-01-03 ENCOUNTER — HOSPITAL ENCOUNTER (OUTPATIENT)
Dept: GENERAL RADIOLOGY | Age: 88
Discharge: HOME | End: 2023-01-03
Attending: INTERNAL MEDICINE
Payer: MEDICARE

## 2023-01-03 VITALS
WEIGHT: 139 LBS | SYSTOLIC BLOOD PRESSURE: 180 MMHG | HEIGHT: 61 IN | HEART RATE: 63 BPM | OXYGEN SATURATION: 97 % | DIASTOLIC BLOOD PRESSURE: 70 MMHG | BODY MASS INDEX: 26.24 KG/M2

## 2023-01-03 DIAGNOSIS — N18.9 CHRONIC KIDNEY DISEASE, UNSPECIFIED CKD STAGE: ICD-10-CM

## 2023-01-03 DIAGNOSIS — I89.0 LYMPHEDEMA: ICD-10-CM

## 2023-01-03 DIAGNOSIS — M54.6 PAIN IN THORACIC SPINE: Primary | ICD-10-CM

## 2023-01-03 DIAGNOSIS — I10 ESSENTIAL HYPERTENSION: Primary | ICD-10-CM

## 2023-01-03 DIAGNOSIS — R07.9 CHEST PAIN, UNSPECIFIED TYPE: ICD-10-CM

## 2023-01-03 DIAGNOSIS — M54.6 PAIN IN THORACIC SPINE: ICD-10-CM

## 2023-01-03 PROCEDURE — 1101F PT FALLS ASSESS-DOCD LE1/YR: CPT | Performed by: SPECIALIST

## 2023-01-03 PROCEDURE — G8427 DOCREV CUR MEDS BY ELIG CLIN: HCPCS | Performed by: SPECIALIST

## 2023-01-03 PROCEDURE — G8536 NO DOC ELDER MAL SCRN: HCPCS | Performed by: SPECIALIST

## 2023-01-03 PROCEDURE — G8432 DEP SCR NOT DOC, RNG: HCPCS | Performed by: SPECIALIST

## 2023-01-03 PROCEDURE — 72072 X-RAY EXAM THORAC SPINE 3VWS: CPT

## 2023-01-03 PROCEDURE — 99214 OFFICE O/P EST MOD 30 MIN: CPT | Performed by: SPECIALIST

## 2023-01-03 PROCEDURE — G8417 CALC BMI ABV UP PARAM F/U: HCPCS | Performed by: SPECIALIST

## 2023-01-03 PROCEDURE — 1090F PRES/ABSN URINE INCON ASSESS: CPT | Performed by: SPECIALIST

## 2023-01-03 PROCEDURE — 1123F ACP DISCUSS/DSCN MKR DOCD: CPT | Performed by: SPECIALIST

## 2023-01-03 PROCEDURE — G0463 HOSPITAL OUTPT CLINIC VISIT: HCPCS | Performed by: SPECIALIST

## 2023-01-03 PROCEDURE — 72100 X-RAY EXAM L-S SPINE 2/3 VWS: CPT

## 2023-01-03 NOTE — PROGRESS NOTES
Chief Complaint   Patient presents with    Follow-up     6 weeks    Hypertension     Vitals:    01/03/23 1301 01/03/23 1307   BP: (!) 178/70 (!) 180/70   BP 1 Location: Left upper arm Right upper arm   BP Patient Position: Sitting Sitting   BP Cuff Size: Adult Adult   Pulse: 63    Height: 5' 1\" (1.549 m)    Weight: 139 lb (63 kg)    SpO2: 97%      Chest pain denied   SOB denied   Palpitations denied   Swelling in hands/feet denied   Dizziness denied   Recent hospital stays denied   Refills denied     Same pain as before; happening more often.

## 2023-01-03 NOTE — PROGRESS NOTES
Alton Musa MD. [facility] [place]              Patient: Joss Gibbs  : 1935      Today's Date: 1/3/2023          HISTORY OF PRESENT ILLNESS:     History of Present Illness:    Lost 45 lbs with stress of caring for sister. She says she feels fine now. But then notes some more pain in chest when she lies down. She says BP OK at home 126/72 yesterday. PAST MEDICAL HISTORY:     Past Medical History:   Diagnosis Date    CKD (chronic kidney disease)     Sees Dr. Heriberto Hernandez     Dyslipidemia     GERD (gastroesophageal reflux disease)     Gout     HTN (hypertension)     Lymphedema     wears wraps       Past Surgical History:   Procedure Laterality Date    HX BREAST BIOPSY Bilateral 30+ yrs    negative pathology    HX BREAST LUMPECTOMY Right     pt said many years ago they went in and removed a lump on both breasts     HX BREAST LUMPECTOMY Left          MEDICATIONS:     Current Outpatient Medications   Medication Sig Dispense Refill    amLODIPine (NORVASC) 5 mg tablet Take 1 Tablet by mouth daily. 90 Tablet 3    metoprolol succinate (TOPROL-XL) 25 mg XL tablet Take 1 Tablet by mouth daily. 90 Tablet 3    ascorbic acid, vitamin C, (VITAMIN C) 250 mg tablet Take  by mouth.      pantoprazole (PROTONIX) 40 mg granules for oral suspension 40 mg daily. cholecalciferol (VITAMIN D3) 25 mcg (1,000 unit) cap Take  by mouth daily. allopurinoL (ZYLOPRIM) 100 mg tablet Take  by mouth daily. ezetimibe (ZETIA) 10 mg tablet Take  by mouth. aspirin delayed-release 81 mg tablet Take  by mouth daily.          Allergies   Allergen Reactions    Ibuprofen Other (comments)     D/t kidneys             SOCIAL HISTORY:     Social History     Tobacco Use    Smoking status: Former     Types: Cigarettes     Quit date:      Years since quittin.0    Smokeless tobacco: Never   Substance Use Topics    Alcohol use: Not Currently    Drug use: Never         FAMILY HISTORY:     Family History   Problem Relation Age of Onset    Breast Cancer Maternal Aunt     Heart Disease Mother            REVIEW OF SYMPTOMS:     Review of Symptoms:  Constitutional: Negative for fever, chills  HEENT: Negative for nosebleeds, tinnitus, and vision changes. Respiratory: Negative for cough, wheezing  Cardiovascular: Negative for orthopnea, claudication, syncope, and PND. Gastrointestinal: Negative for abdominal pain, diarrhea, melena. Genitourinary: Negative for dysuria  Musculoskeletal: Negative for myalgias. Skin: Negative for rash  Heme: No problems bleeding. Neurological: Negative for speech change and focal weakness.   + HA          PHYSICAL EXAM:     Physical Exam:  Visit Vitals  BP (!) 180/70 (BP 1 Location: Right upper arm, BP Patient Position: Sitting, BP Cuff Size: Adult)   Pulse 63   Ht 5' 1\" (1.549 m)   Wt 139 lb (63 kg)   SpO2 97%   BMI 26.26 kg/m²     Patient appears generally well, mood and affect are appropriate and pleasant. HEENT:  Hearing intact, non-icteric, normocephalic, atraumatic. Neck Exam: Supple    Lung Exam: Clear to auscultation, even breath sounds. Cardiac Exam: Regular rate and rhythm with no murmur or rub  Abdomen: Soft, non-tender,   Extremities: Moves all ext well. + mild chronic lower extremity edema.   MSKTL: Overall good ROM ext  Skin: No significant rashes  Psych: Appropriate affect  Neuro - Grossly intact      LABS / OTHER STUDIES reviewed:     Labs 5/3/21 - Cr 1.85, Hgb 11, K 4.6      Lab Results   Component Value Date/Time    Sodium 142 06/29/2021 11:02 AM    Potassium 4.3 06/29/2021 11:02 AM    Chloride 98 06/29/2021 11:02 AM    CO2 26 06/29/2021 11:02 AM    Glucose 101 (H) 06/29/2021 11:02 AM    BUN 31 (H) 06/29/2021 11:02 AM    Creatinine 2.59 (H) 06/29/2021 11:02 AM    BUN/Creatinine ratio 12 06/29/2021 11:02 AM    GFR est AA 19 (L) 06/29/2021 11:02 AM    GFR est non-AA 16 (L) 06/29/2021 11:02 AM    Calcium 10.0 06/29/2021 11:02 AM           CARDIAC DIAGNOSTICS:     Cardiac Evaluation Includes:  I reviewed the results below. EKG 9/23/20 - sinus quyen, incomplete RBBB, non-specific T wave abn   EKG 1/12/22 - NSR, RBBB        Lexiscan Cardiolite 10/6/20 - Normal MPI, LVEF 68%      Echo 10/6/20 - LVEF 55-60%, grade 1 diastology, mild LAE          ASSESSMENT AND PLAN:     Assessment and Plan:    1) HTN  - Was started on chlorthalidone by Nephrology on May 7, 2021. On 6/4/21 labs show Cr increased from 1.85 to 2.47. On 6/21/21 ---> Had her stop chlorthalidone as Cr was 2.5. Advised to see Nephrology to FU on kidney function and HTN.   - On 11/16/22 - BP is high ---> will increase norvasc to 5 mg and have her follow BP at home ---> reassess in one month  - On 1/3/23 - Although BP is high in the office, she says BP is OK at home - runs around 126/72 or so. Cont meds. 2) Atypical chest pain   - Lexiscan Cardiolite 10/6/20 - Normal MPI, LVEF 68%   - Echo 10/6/20 - LVEF 55-60%, grade 1 diastology, mild LAE   - She has a \"thump\" in chest for a second - sounds like she may be having PVC's -- I discussed checking a heart monitor and she decided to get one later if problems worsen - symptoms sound benign  - Has a slight heart thump 2-3 x a day (just like a light poke). - On 1/3/23 - she notes more chest pain when she lies down --> recheck an echo and Lexiscan caridolite      3) Lymphedema   - she does leg wraps and legs look good      4) CKD  - Sees Nephrology (Dr. Elizabeth Escobar partner)   - labs followed by Nephrology      5) Dyslipidemia   - Has been on Zetia for years (denies problems with a statin)   - labs followed by PCP     6) See Maldonado Urias in 3 months. She lives in [place]. Sister (dementia) passed away in 2022.  at 36 yo. Has 2 kids and 3 grand kids. Worked at [employer] and [employer]. Jose Doan MD, [place] [address]  [address] [place] [address], [address]  [address], Brandy Garza [address] [place] [address]  Ph: [phone]   Ph [phone]

## 2023-01-03 NOTE — PROGRESS NOTES
Orders for Lexiscan cardiolite and echo when possible (chest pain)     See Master Baptiste in 3 months  per Dr. Lavonne Trejo VO.

## 2023-02-08 ENCOUNTER — ANCILLARY PROCEDURE (OUTPATIENT)
Dept: CARDIOLOGY CLINIC | Age: 88
End: 2023-02-08
Payer: MEDICARE

## 2023-02-08 VITALS — WEIGHT: 139 LBS | BODY MASS INDEX: 26.24 KG/M2 | HEIGHT: 61 IN

## 2023-02-08 VITALS
BODY MASS INDEX: 26.24 KG/M2 | SYSTOLIC BLOOD PRESSURE: 184 MMHG | DIASTOLIC BLOOD PRESSURE: 84 MMHG | WEIGHT: 139 LBS | HEIGHT: 61 IN

## 2023-02-08 DIAGNOSIS — R07.9 CHEST PAIN, UNSPECIFIED TYPE: ICD-10-CM

## 2023-02-08 DIAGNOSIS — I10 ESSENTIAL HYPERTENSION: ICD-10-CM

## 2023-02-08 LAB
ECHO AO ASC DIAM: 3 CM
ECHO AO ASCENDING AORTA INDEX: 1.85 CM/M2
ECHO AO ROOT DIAM: 3.1 CM
ECHO AO ROOT INDEX: 1.91 CM/M2
ECHO AV AREA PEAK VELOCITY: 2.1 CM2
ECHO AV AREA VTI: 2.3 CM2
ECHO AV AREA/BSA PEAK VELOCITY: 1.3 CM2/M2
ECHO AV AREA/BSA VTI: 1.4 CM2/M2
ECHO AV MEAN GRADIENT: 4 MMHG
ECHO AV MEAN VELOCITY: 1 M/S
ECHO AV PEAK GRADIENT: 9 MMHG
ECHO AV PEAK VELOCITY: 1.5 M/S
ECHO AV VELOCITY RATIO: 0.73
ECHO AV VTI: 32.5 CM
ECHO EST RA PRESSURE: 3 MMHG
ECHO LA DIAMETER INDEX: 2.47 CM/M2
ECHO LA DIAMETER: 4 CM
ECHO LA TO AORTIC ROOT RATIO: 1.29
ECHO LA VOL 2C: 62 ML (ref 22–52)
ECHO LA VOL 4C: 67 ML (ref 22–52)
ECHO LA VOLUME AREA LENGTH: 73 ML
ECHO LA VOLUME INDEX A2C: 38 ML/M2 (ref 16–34)
ECHO LA VOLUME INDEX A4C: 41 ML/M2 (ref 16–34)
ECHO LA VOLUME INDEX AREA LENGTH: 45 ML/M2 (ref 16–34)
ECHO LV E' LATERAL VELOCITY: 9 CM/S
ECHO LV E' SEPTAL VELOCITY: 8 CM/S
ECHO LV EDV A2C: 69 ML
ECHO LV EDV A4C: 100 ML
ECHO LV EDV BP: 86 ML (ref 56–104)
ECHO LV EDV INDEX A4C: 62 ML/M2
ECHO LV EDV INDEX BP: 53 ML/M2
ECHO LV EDV NDEX A2C: 43 ML/M2
ECHO LV EJECTION FRACTION A2C: 62 %
ECHO LV EJECTION FRACTION A4C: 56 %
ECHO LV EJECTION FRACTION BIPLANE: 61 % (ref 55–100)
ECHO LV ESV A2C: 26 ML
ECHO LV ESV A4C: 44 ML
ECHO LV ESV BP: 34 ML (ref 19–49)
ECHO LV ESV INDEX A2C: 16 ML/M2
ECHO LV ESV INDEX A4C: 27 ML/M2
ECHO LV ESV INDEX BP: 21 ML/M2
ECHO LV FRACTIONAL SHORTENING: 33 % (ref 28–44)
ECHO LV INTERNAL DIMENSION DIASTOLE INDEX: 3.15 CM/M2
ECHO LV INTERNAL DIMENSION DIASTOLIC: 5.1 CM (ref 3.9–5.3)
ECHO LV INTERNAL DIMENSION SYSTOLIC INDEX: 2.1 CM/M2
ECHO LV INTERNAL DIMENSION SYSTOLIC: 3.4 CM
ECHO LV IVSD: 0.7 CM (ref 0.6–0.9)
ECHO LV MASS 2D: 118.7 G (ref 67–162)
ECHO LV MASS INDEX 2D: 73.3 G/M2 (ref 43–95)
ECHO LV POSTERIOR WALL DIASTOLIC: 0.7 CM (ref 0.6–0.9)
ECHO LV RELATIVE WALL THICKNESS RATIO: 0.27
ECHO LVOT AREA: 2.8 CM2
ECHO LVOT AV VTI INDEX: 0.79
ECHO LVOT DIAM: 1.9 CM
ECHO LVOT MEAN GRADIENT: 3 MMHG
ECHO LVOT PEAK GRADIENT: 5 MMHG
ECHO LVOT PEAK VELOCITY: 1.1 M/S
ECHO LVOT STROKE VOLUME INDEX: 45.1 ML/M2
ECHO LVOT SV: 73.1 ML
ECHO LVOT VTI: 25.8 CM
ECHO MV A VELOCITY: 0.76 M/S
ECHO MV AREA PHT: 3.5 CM2
ECHO MV AREA VTI: 3.4 CM2
ECHO MV E DECELERATION TIME (DT): 219.6 MS
ECHO MV E VELOCITY: 0.69 M/S
ECHO MV E/A RATIO: 0.91
ECHO MV E/E' LATERAL: 7.67
ECHO MV E/E' RATIO (AVERAGED): 8.15
ECHO MV E/E' SEPTAL: 8.63
ECHO MV LVOT VTI INDEX: 0.83
ECHO MV MAX VELOCITY: 0.8 M/S
ECHO MV MEAN GRADIENT: 1 MMHG
ECHO MV MEAN VELOCITY: 0.6 M/S
ECHO MV PEAK GRADIENT: 3 MMHG
ECHO MV PRESSURE HALF TIME (PHT): 63.7 MS
ECHO MV VTI: 21.5 CM
ECHO RA AREA 4C: 18 CM2
ECHO RA END SYSTOLIC VOLUME APICAL 4 CHAMBER INDEX BSA: 33 ML/M2
ECHO RA VOLUME: 53 ML
ECHO RIGHT VENTRICULAR SYSTOLIC PRESSURE (RVSP): 38 MMHG
ECHO RV INTERNAL DIMENSION: 3.9 CM
ECHO RV TAPSE: 2 CM (ref 1.7–?)
ECHO TV REGURGITANT MAX VELOCITY: 2.95 M/S
ECHO TV REGURGITANT PEAK GRADIENT: 35 MMHG
NUC STRESS EJECTION FRACTION: 73 %
STRESS BASELINE DIAS BP: 84 MMHG
STRESS BASELINE HR: 71 BPM
STRESS BASELINE SYS BP: 152 MMHG
STRESS O2 SAT PEAK: 97 %
STRESS O2 SAT REST: 98 %
STRESS PEAK DIAS BP: 80 MMHG
STRESS PEAK SYS BP: 156 MMHG
STRESS PERCENT HR ACHIEVED: 71 %
STRESS POST PEAK HR: 95 BPM
STRESS RATE PRESSURE PRODUCT: NORMAL BPM*MMHG
STRESS ST DEPRESSION: 0 MM
STRESS TARGET HR: 133 BPM

## 2023-02-08 PROCEDURE — A9500 TC99M SESTAMIBI: HCPCS | Performed by: SPECIALIST

## 2023-02-08 PROCEDURE — 78452 HT MUSCLE IMAGE SPECT MULT: CPT | Performed by: SPECIALIST

## 2023-02-08 PROCEDURE — 93018 CV STRESS TEST I&R ONLY: CPT | Performed by: SPECIALIST

## 2023-02-08 PROCEDURE — 93306 TTE W/DOPPLER COMPLETE: CPT | Performed by: SPECIALIST

## 2023-02-08 PROCEDURE — 93016 CV STRESS TEST SUPVJ ONLY: CPT | Performed by: SPECIALIST

## 2023-02-08 PROCEDURE — 93017 CV STRESS TEST TRACING ONLY: CPT | Performed by: SPECIALIST

## 2023-02-08 RX ORDER — TETRAKIS(2-METHOXYISOBUTYLISOCYANIDE)COPPER(I) TETRAFLUOROBORATE 1 MG/ML
10 INJECTION, POWDER, LYOPHILIZED, FOR SOLUTION INTRAVENOUS ONCE
Status: COMPLETED | OUTPATIENT
Start: 2023-02-08 | End: 2023-02-08

## 2023-02-08 RX ORDER — TETRAKIS(2-METHOXYISOBUTYLISOCYANIDE)COPPER(I) TETRAFLUOROBORATE 1 MG/ML
30 INJECTION, POWDER, LYOPHILIZED, FOR SOLUTION INTRAVENOUS ONCE
Status: COMPLETED | OUTPATIENT
Start: 2023-02-08 | End: 2023-02-08

## 2023-02-08 RX ADMIN — REGADENOSON 0.4 MG: 0.08 INJECTION, SOLUTION INTRAVENOUS at 10:17

## 2023-02-08 RX ADMIN — TECHNETIUM TC 99M SESTAMIBI 26.2 MILLICURIE: 1 INJECTION, POWDER, FOR SOLUTION INTRAVENOUS at 10:21

## 2023-02-08 RX ADMIN — TECHNETIUM TC 99M SESTAMIBI 8.4 MILLICURIE: 1 INJECTION, POWDER, FOR SOLUTION INTRAVENOUS at 09:24

## 2023-02-08 NOTE — PROGRESS NOTES
Dear Ms. Patel,  Your echo and stress test results are stable. Please let me know if you have any questions.    Jenny Davison,  Dr. Hari Tirado

## 2023-02-21 ENCOUNTER — TELEPHONE (OUTPATIENT)
Dept: CARDIOLOGY CLINIC | Age: 88
End: 2023-02-21

## 2023-02-21 NOTE — TELEPHONE ENCOUNTER
Unable to reach pt. Message left detailed message explaining Dr. Lynette Cantu is out of the office until March 20th, edilberto her test are normal, but if she has something else that is urgent she wishes to discuss, we could get one of the Nps to call her.

## 2023-03-22 ENCOUNTER — OFFICE VISIT (OUTPATIENT)
Dept: CARDIOLOGY CLINIC | Age: 88
End: 2023-03-22
Payer: MEDICARE

## 2023-03-22 VITALS
DIASTOLIC BLOOD PRESSURE: 80 MMHG | HEIGHT: 61 IN | BODY MASS INDEX: 26.24 KG/M2 | SYSTOLIC BLOOD PRESSURE: 138 MMHG | OXYGEN SATURATION: 99 % | HEART RATE: 58 BPM | WEIGHT: 139 LBS

## 2023-03-22 DIAGNOSIS — I10 ESSENTIAL HYPERTENSION: Primary | ICD-10-CM

## 2023-03-22 DIAGNOSIS — I89.0 LYMPHEDEMA: ICD-10-CM

## 2023-03-22 DIAGNOSIS — N18.9 CHRONIC KIDNEY DISEASE, UNSPECIFIED CKD STAGE: ICD-10-CM

## 2023-03-22 PROCEDURE — G0463 HOSPITAL OUTPT CLINIC VISIT: HCPCS | Performed by: SPECIALIST

## 2023-03-22 NOTE — PROGRESS NOTES
Syed Darling MD. [facility] [place]              Patient: Alondra Roque  : 1935      Today's Date: 3/22/2023          HISTORY OF PRESENT ILLNESS:     History of Present Illness:    Lost 45 lbs with stress of caring for sister. She says she feels fine now. But then notes some more sharp pain in chest when she lies down - better sitting pain. No exertional pain. Feeling better overall. PAST MEDICAL HISTORY:     Past Medical History:   Diagnosis Date    CKD (chronic kidney disease)     Sees Dr. Lisette Robbins     Dyslipidemia     GERD (gastroesophageal reflux disease)     Gout     HTN (hypertension)     Lymphedema     wears wraps       Past Surgical History:   Procedure Laterality Date    HX BREAST BIOPSY Bilateral 30+ yrs    negative pathology    HX BREAST LUMPECTOMY Right     pt said many years ago they went in and removed a lump on both breasts     HX BREAST LUMPECTOMY Left          MEDICATIONS:     Current Outpatient Medications   Medication Sig Dispense Refill    amLODIPine (NORVASC) 5 mg tablet Take 1 Tablet by mouth daily. 90 Tablet 3    metoprolol succinate (TOPROL-XL) 25 mg XL tablet Take 1 Tablet by mouth daily. 90 Tablet 3    ascorbic acid, vitamin C, (VITAMIN C) 250 mg tablet Take  by mouth.      pantoprazole (PROTONIX) 40 mg granules for oral suspension 40 mg daily. cholecalciferol (VITAMIN D3) 25 mcg (1,000 unit) cap Take  by mouth daily. allopurinoL (ZYLOPRIM) 100 mg tablet Take  by mouth daily. ezetimibe (ZETIA) 10 mg tablet Take  by mouth. aspirin delayed-release 81 mg tablet Take  by mouth daily.          Allergies   Allergen Reactions    Ibuprofen Other (comments)     D/t kidneys             SOCIAL HISTORY:     Social History     Tobacco Use    Smoking status: Former     Types: Cigarettes     Quit date:      Years since quittin.2    Smokeless tobacco: Never   Substance Use Topics    Alcohol use: Not Currently    Drug use: Never         FAMILY HISTORY: Family History   Problem Relation Age of Onset    Breast Cancer Maternal Aunt     Heart Disease Mother            REVIEW OF SYMPTOMS:     Review of Symptoms:  Constitutional: Negative for fever, chills  HEENT: Negative for nosebleeds, tinnitus, and vision changes. Respiratory: Negative for cough, wheezing  Cardiovascular: Negative for orthopnea, claudication, syncope, and PND. Gastrointestinal: Negative for abdominal pain, diarrhea, melena. Genitourinary: Negative for dysuria  Musculoskeletal: Negative for myalgias. Skin: Negative for rash  Heme: No problems bleeding. Neurological: Negative for speech change and focal weakness.   + HA          PHYSICAL EXAM:     Physical Exam:  Visit Vitals  /80 (BP 1 Location: Left upper arm, BP Patient Position: Sitting)   Pulse (!) 58   Ht 5' 1\" (1.549 m)   Wt 139 lb (63 kg)   SpO2 99%   BMI 26.26 kg/m²     Patient appears generally well, mood and affect are appropriate and pleasant. HEENT:  Hearing intact, non-icteric, normocephalic, atraumatic. Neck Exam: Supple    Lung Exam: Clear to auscultation, even breath sounds. Cardiac Exam: Regular rate and rhythm with no murmur or rub  Abdomen: Soft, non-tender,   Extremities: Moves all ext well. + mild chronic lower extremity edema.   MSKTL: Overall good ROM ext  Skin: No significant rashes  Psych: Appropriate affect  Neuro - Grossly intact      LABS / OTHER STUDIES reviewed:     Labs 5/3/21 - Cr 1.85, Hgb 11, K 4.6      Lab Results   Component Value Date/Time    Sodium 142 06/29/2021 11:02 AM    Potassium 4.3 06/29/2021 11:02 AM    Chloride 98 06/29/2021 11:02 AM    CO2 26 06/29/2021 11:02 AM    Glucose 101 (H) 06/29/2021 11:02 AM    BUN 31 (H) 06/29/2021 11:02 AM    Creatinine 2.59 (H) 06/29/2021 11:02 AM    BUN/Creatinine ratio 12 06/29/2021 11:02 AM    GFR est AA 19 (L) 06/29/2021 11:02 AM    GFR est non-AA 16 (L) 06/29/2021 11:02 AM    Calcium 10.0 06/29/2021 11:02 AM           CARDIAC DIAGNOSTICS: Cardiac Evaluation Includes:  I reviewed the results below. EKG 9/23/20 - sinus quyen, incomplete RBBB, non-specific T wave abn   EKG 1/12/22 - NSR, RBBB        Lexiscan Cardiolite 10/6/20 - Normal MPI, LVEF 68%      Echo 10/6/20 - LVEF 55-60%, grade 1 diastology, mild LAE       Echo 2/7/23 - LVEF 60%, mod LAE    Lexiscan Cardiolite 2/7/23 - normal MPI in prone imaging. LVEF 73%         ASSESSMENT AND PLAN:     Assessment and Plan:    1) HTN  - Was started on chlorthalidone by Nephrology on May 7, 2021. On 6/4/21 labs show Cr increased from 1.85 to 2.47. On 6/21/21 ---> Had her stop chlorthalidone as Cr was 2.5. Advised to see Nephrology to FU on kidney function and HTN.   - BP OK at home on current meds ---> will keep meds as is     2) Atypical chest pain   - Lexiscan Cardiolite 10/6/20 - Normal MPI, LVEF 68%   - Echo 10/6/20 - LVEF 55-60%, grade 1 diastology, mild LAE   - She has a \"thump\" in chest for a second - sounds like she may be having PVC's -- I discussed checking a heart monitor and she decided to get one later if problems worsen - symptoms sound benign  - Has a slight heart thump 2-3 x a day (just like a light poke). - On 1/3/23 - she notes more chest pain when she lies down  - Echo 2/7/23 - LVEF 60%, mod LAE  - Lexiscan Cardiolite 2/7/23 - normal MPI in prone imaging. LVEF 73%  - On 3/22/23 - She is doing fine lately      3) Lymphedema   - she does leg wraps and legs look good      4) CKD  - Sees Nephrology (Dr. Palomo Phillips partner)   - labs followed by Nephrology      5) Dyslipidemia   - Has been on Zetia for years (denies problems with a statin)   - labs followed by PCP     6) See Tammy Miller in 6 months. See me in 1 year. She lives in [place]. Sister (dementia) passed away in 2022.  at 38 yo. Has 2 kids and 3 grand kids. Worked at [employer] and [employer].            Juan Dial MD, [address] [place]  [address] [place] [address], [address]  [place] [address]  [address]  [place], [address] [place] [address] Drive  [facility], [address] [place] [address]  Ph: [phone]    [phone]

## 2023-03-22 NOTE — PROGRESS NOTES
Chief Complaint   Patient presents with    Follow-up     Testing follow up Echo, Nuc 2/8     Vitals:    03/22/23 1052   BP: 138/80   BP 1 Location: Left upper arm   BP Patient Position: Sitting   Pulse: (!) 58   Height: 5' 1\" (1.549 m)   Weight: 139 lb (63 kg)   SpO2: 99%       Chest pain denied     SOB denied     Palpitations denied     Swelling in hands/feet denied     Dizziness denied     Recent hospital stays denied     Refills denied

## 2023-06-01 ENCOUNTER — TRANSCRIBE ORDERS (OUTPATIENT)
Facility: HOSPITAL | Age: 88
End: 2023-06-01

## 2023-06-01 DIAGNOSIS — Z12.31 SCREENING MAMMOGRAM FOR BREAST CANCER: Primary | ICD-10-CM

## 2023-06-07 ENCOUNTER — HOSPITAL ENCOUNTER (OUTPATIENT)
Facility: HOSPITAL | Age: 88
Discharge: HOME | End: 2023-06-10
Attending: INTERNAL MEDICINE
Payer: MEDICARE

## 2023-06-07 DIAGNOSIS — Z12.31 SCREENING MAMMOGRAM FOR BREAST CANCER: ICD-10-CM

## 2023-06-07 PROCEDURE — 77063 BREAST TOMOSYNTHESIS BI: CPT

## 2023-09-28 ENCOUNTER — OFFICE VISIT (OUTPATIENT)
Age: 88
End: 2023-09-28
Payer: MEDICARE

## 2023-09-28 VITALS
HEART RATE: 52 BPM | SYSTOLIC BLOOD PRESSURE: 132 MMHG | HEIGHT: 61 IN | DIASTOLIC BLOOD PRESSURE: 62 MMHG | BODY MASS INDEX: 26.47 KG/M2 | OXYGEN SATURATION: 98 % | WEIGHT: 140.2 LBS

## 2023-09-28 DIAGNOSIS — I10 ESSENTIAL (PRIMARY) HYPERTENSION: Primary | ICD-10-CM

## 2023-09-28 DIAGNOSIS — I89.0 LYMPHEDEMA, NOT ELSEWHERE CLASSIFIED: ICD-10-CM

## 2023-09-28 DIAGNOSIS — N18.32 STAGE 3B CHRONIC KIDNEY DISEASE (HCC): ICD-10-CM

## 2023-09-28 PROCEDURE — 1036F TOBACCO NON-USER: CPT

## 2023-09-28 PROCEDURE — 93005 ELECTROCARDIOGRAM TRACING: CPT

## 2023-09-28 PROCEDURE — 93010 ELECTROCARDIOGRAM REPORT: CPT

## 2023-09-28 PROCEDURE — 1090F PRES/ABSN URINE INCON ASSESS: CPT

## 2023-09-28 PROCEDURE — 1123F ACP DISCUSS/DSCN MKR DOCD: CPT

## 2023-09-28 PROCEDURE — G8419 CALC BMI OUT NRM PARAM NOF/U: HCPCS

## 2023-09-28 PROCEDURE — 99214 OFFICE O/P EST MOD 30 MIN: CPT

## 2023-09-28 PROCEDURE — G8427 DOCREV CUR MEDS BY ELIG CLIN: HCPCS

## 2023-09-28 RX ORDER — METHOCARBAMOL 500 MG/1
500 TABLET, FILM COATED ORAL DAILY PRN
COMMUNITY

## 2023-09-28 NOTE — PROGRESS NOTES
Room # 4  Chief Complaint   Patient presents with    Follow-up     6 months    Hypertension     Vitals:    09/28/23 1302   BP: 132/62   Site: Right Upper Arm   Position: Sitting   Cuff Size: Medium Adult   Pulse: 52   SpO2: 98%   Weight: 140 lb 3.2 oz (63.6 kg)   Height: 5' 1\" (1.549 m)     Chest pain denied   SOB denied   Palpitations denied   Swelling in hands/feet denied   Dizziness denied   Recent hospital stays denied   Refills denied     126/70 at 's office
incomplete RBBB, non-specific T wave abn    EKG 1/12/22 - NSR, RBBB  EKG 9/28/23 - NSR, RBBB           Lexiscan Cardiolite 10/6/20 - Normal MPI, LVEF 68%        Echo 10/6/20 - LVEF 55-60%, grade 1 diastology, mild LAE          Echo 2/7/23 - LVEF 60%, mod LAE      Lexiscan Cardiolite 2/7/23 - normal MPI in prone imaging. LVEF 73%      ASSESSMENT AND PLAN:     Assessment and Plan:   1) HTN   - Was started on chlorthalidone by Nephrology on May 7, 2021. On 6/4/21 labs show Cr increased from 1.85 to 2.47. On 6/21/21 --->  Had her stop chlorthalidone as Cr was 2.5. Advised to see Nephrology to FU on kidney function and HTN.    - BP OK at home on current meds ---> will keep meds as is       2) Atypical chest pain    - Lexiscan Cardiolite 10/6/20 - Normal MPI, LVEF 68%    - Echo 10/6/20 - LVEF 55-60%, grade 1 diastology, mild LAE    - She has a \"thump\" in chest for a second - sounds like she may be having PVC's -- I discussed checking a heart monitor and she decided to get one later if problems worsen - symptoms sound benign   - Has a slight heart thump 2-3 x a day (just like a light poke). - On 1/3/23 - she notes more chest pain when she lies down   - Echo 2/7/23 - LVEF 60%, mod LAE   - Lexiscan Cardiolite 2/7/23 - normal MPI in prone imaging. LVEF 73%   - On 9/28/23 - She is doing fine lately        3) Lymphedema    - she does leg wraps and legs look good        4) CKD  - Sees Nephrology (Dr. Tito Sanford)    - labs followed by Nephrology, appear stable       5) Dyslipidemia    - Has been on Zetia for years (denies problems with a statin)    - labs followed by PCP       6) See Dr. Dara Gong in 6 months. She lives in [place]. Sister (dementia) passed away in 2022.  at 38 yo. Has 2 kids and 3 grand kids. Worked at [employer] and [employer]. KAT Dee - NP    [address] [facility], [address]  [facility]

## 2024-04-03 ENCOUNTER — OFFICE VISIT (OUTPATIENT)
Age: 89
End: 2024-04-03
Payer: MEDICARE

## 2024-04-03 VITALS
BODY MASS INDEX: 27.19 KG/M2 | HEART RATE: 52 BPM | SYSTOLIC BLOOD PRESSURE: 138 MMHG | WEIGHT: 144 LBS | OXYGEN SATURATION: 98 % | DIASTOLIC BLOOD PRESSURE: 68 MMHG | HEIGHT: 61 IN

## 2024-04-03 DIAGNOSIS — I89.0 LYMPHEDEMA, NOT ELSEWHERE CLASSIFIED: Primary | ICD-10-CM

## 2024-04-03 DIAGNOSIS — I10 ESSENTIAL (PRIMARY) HYPERTENSION: ICD-10-CM

## 2024-04-03 DIAGNOSIS — N18.32 STAGE 3B CHRONIC KIDNEY DISEASE (HCC): ICD-10-CM

## 2024-04-03 PROCEDURE — G8419 CALC BMI OUT NRM PARAM NOF/U: HCPCS | Performed by: SPECIALIST

## 2024-04-03 PROCEDURE — 1036F TOBACCO NON-USER: CPT | Performed by: SPECIALIST

## 2024-04-03 PROCEDURE — 99214 OFFICE O/P EST MOD 30 MIN: CPT | Performed by: SPECIALIST

## 2024-04-03 PROCEDURE — G8427 DOCREV CUR MEDS BY ELIG CLIN: HCPCS | Performed by: SPECIALIST

## 2024-04-03 PROCEDURE — 1123F ACP DISCUSS/DSCN MKR DOCD: CPT | Performed by: SPECIALIST

## 2024-04-03 PROCEDURE — 1090F PRES/ABSN URINE INCON ASSESS: CPT | Performed by: SPECIALIST

## 2024-04-03 RX ORDER — TRAMADOL HYDROCHLORIDE 50 MG/1
1 TABLET ORAL 2 TIMES DAILY PRN
COMMUNITY

## 2024-04-03 RX ORDER — CHLORTHALIDONE 25 MG/1
1 TABLET ORAL DAILY
COMMUNITY

## 2024-04-03 NOTE — PROGRESS NOTES
Chief Complaint   Patient presents with    Follow-up     6 months    Hypertension     Vitals:    04/03/24 1258   BP: 138/68   Site: Left Upper Arm   Position: Sitting   Cuff Size: Medium Adult   Pulse: 52   SpO2: 98%   Weight: 65.3 kg (144 lb)   Height: 1.549 m (5' 1\")       Chest pain NO     ER, urgent care, or hospitalized outside of [facility] since your last visit?  NO     Refills NO     Having foot pain like a million needles sticking in foot.    Chlorthalidone 25 mg daily w Dr. Mcintyre: \"Metoprolol, Chlorthalidone, Amlodipine 5 \"

## 2024-04-03 NOTE — PROGRESS NOTES
Patient: Jayla Phelps  : 1935    Primary Cardiologist: Jey Arteaga MD. [facility]  Last Office Visit:     Today's Date: 4/3/2024    HISTORY OF PRESENT ILLNESS:     History of Present Illness:  Presents today for routine follow-up.     Having foot pain like a million needles sticking in foot.   Chlorthalidone 25 mg daily w Dr. Mcintyre: \"Metoprolol, Chlorthalidone, Amlodipine 5 \"  Has some mild CP, random, lasting a second.    Edema stable.  Breathing is OK.  Remains active.        PAST MEDICAL HISTORY:     Past Medical History:   Diagnosis Date    CKD (chronic kidney disease)     Sees Dr. Parra     Dyslipidemia     GERD (gastroesophageal reflux disease)     Gout     HTN (hypertension)     Lymphedema     wears wraps       Past Surgical History:   Procedure Laterality Date    BREAST BIOPSY Bilateral 30+ yrs    negative pathology    BREAST LUMPECTOMY Left     BREAST LUMPECTOMY Right     pt said many years ago they went in and removed a lump on both breasts        CURRENT MEDICATIONS:      Current Outpatient Medications   Medication Sig Dispense Refill    chlorthalidone (HYGROTON) 25 MG tablet Take 1 tablet by mouth daily      traMADol (ULTRAM) 50 MG tablet Take 1 tablet by mouth 2 times daily as needed.      methocarbamol (ROBAXIN) 500 MG tablet Take 1 tablet by mouth daily as needed      allopurinol (ZYLOPRIM) 100 MG tablet Take by mouth daily      amLODIPine (NORVASC) 5 MG tablet Take by mouth daily      ascorbic acid (VITAMIN C) 250 MG tablet Take by mouth      aspirin 81 MG EC tablet Take by mouth daily      vitamin D 25 MCG (1000 UT) CAPS Take by mouth daily      ezetimibe (ZETIA) 10 MG tablet Take by mouth      metoprolol succinate (TOPROL XL) 25 MG extended release tablet Take by mouth daily      pantoprazole sodium (PROTONIX) 40 MG PACK packet daily       No current facility-administered medications for this visit.       Allergies   Allergen Reactions    Ibuprofen Other (See Comments)     D/t

## 2024-10-10 ENCOUNTER — CLINICAL DOCUMENTATION (OUTPATIENT)
Age: 89
End: 2024-10-10

## 2024-11-11 NOTE — PROGRESS NOTES
Patient: Jayla Phelps  : 1935    Primary Cardiologist: Jey Arteaga MD. [facility]  Last Office Visit:     Today's Date: 2024    HISTORY OF PRESENT ILLNESS:     History of Present Illness:  Presents today for routine follow-up. Feels well. Denies chest pain, shortness of breath, palpitations.   Edema a little worse today, however, says she has not used wraps in the last few days.   She just sold her house in [place] and moved to [place] with her son.   She is unsure if she is taking chlorthalidone.       PAST MEDICAL HISTORY:     Past Medical History:   Diagnosis Date    CKD (chronic kidney disease)     Sees Dr. Parra     Dyslipidemia     GERD (gastroesophageal reflux disease)     Gout     HTN (hypertension)     Lymphedema     wears wraps       Past Surgical History:   Procedure Laterality Date    BREAST BIOPSY Bilateral 30+ yrs    negative pathology    BREAST LUMPECTOMY Left     BREAST LUMPECTOMY Right     pt said many years ago they went in and removed a lump on both breasts        CURRENT MEDICATIONS:      Current Outpatient Medications   Medication Sig Dispense Refill    allopurinol (ZYLOPRIM) 100 MG tablet Take by mouth daily      amLODIPine (NORVASC) 5 MG tablet Take by mouth daily      aspirin 81 MG EC tablet Take by mouth daily      ezetimibe (ZETIA) 10 MG tablet Take by mouth      metoprolol succinate (TOPROL XL) 25 MG extended release tablet Take by mouth daily      chlorthalidone (HYGROTON) 25 MG tablet Take 1 tablet by mouth daily       No current facility-administered medications for this visit.       Allergies   Allergen Reactions    Ibuprofen Other (See Comments)     D/t kidneys       SOCIAL HISTORY:     Social History     Tobacco Use    Smoking status: Former     Current packs/day: 0.00     Types: Cigarettes     Quit date: 1983     Years since quittin.8    Smokeless tobacco: Never   Substance Use Topics    Alcohol use: Not Currently    Drug use: Never       FAMILY

## 2024-11-12 ENCOUNTER — OFFICE VISIT (OUTPATIENT)
Age: 89
End: 2024-11-12
Payer: MEDICARE

## 2024-11-12 VITALS
HEART RATE: 57 BPM | SYSTOLIC BLOOD PRESSURE: 130 MMHG | DIASTOLIC BLOOD PRESSURE: 68 MMHG | BODY MASS INDEX: 26.24 KG/M2 | HEIGHT: 61 IN | WEIGHT: 139 LBS | OXYGEN SATURATION: 98 %

## 2024-11-12 DIAGNOSIS — I10 ESSENTIAL (PRIMARY) HYPERTENSION: Primary | ICD-10-CM

## 2024-11-12 DIAGNOSIS — N18.32 STAGE 3B CHRONIC KIDNEY DISEASE (HCC): ICD-10-CM

## 2024-11-12 DIAGNOSIS — I89.0 LYMPHEDEMA, NOT ELSEWHERE CLASSIFIED: ICD-10-CM

## 2024-11-12 PROCEDURE — 93005 ELECTROCARDIOGRAM TRACING: CPT

## 2024-11-12 PROCEDURE — 99214 OFFICE O/P EST MOD 30 MIN: CPT

## 2024-11-12 NOTE — PROGRESS NOTES
had concerns including Hypertension.    Vitals:    11/12/24 1318   BP: 130/68   Site: Left Upper Arm   Position: Sitting   Pulse: 57   SpO2: 98%   Weight: 63 kg (139 lb)   Height: 1.549 m (5' 1\")        Chest pain No    Refills No        1. Have you been to the ER, urgent care clinic since your last visit? No       Hospitalized since your last visit? No       Where?        Date?